# Patient Record
Sex: FEMALE | Race: WHITE | NOT HISPANIC OR LATINO | Employment: FULL TIME | URBAN - METROPOLITAN AREA
[De-identification: names, ages, dates, MRNs, and addresses within clinical notes are randomized per-mention and may not be internally consistent; named-entity substitution may affect disease eponyms.]

---

## 2019-08-26 ENCOUNTER — HOSPITAL ENCOUNTER (OUTPATIENT)
Dept: URGENT CARE | Facility: CLINIC | Age: 24
Discharge: HOME | End: 2019-08-26
Attending: PHYSICIAN ASSISTANT

## 2019-08-29 LAB — BACTERIA SPEC AEROBE CULT: NORMAL

## 2020-02-18 ENCOUNTER — OFFICE VISIT CONVERTED (OUTPATIENT)
Dept: FAMILY MEDICINE CLINIC | Facility: CLINIC | Age: 25
End: 2020-02-18
Attending: NURSE PRACTITIONER

## 2020-02-18 ENCOUNTER — HOSPITAL ENCOUNTER (OUTPATIENT)
Dept: LAB | Facility: HOSPITAL | Age: 25
Discharge: HOME | End: 2020-02-18
Attending: NURSE PRACTITIONER

## 2020-02-18 LAB
ALBUMIN SERPL-MCNC: 4.1 G/DL (ref 3.5–5)
ALBUMIN/GLOB SERPL: 1.1 {RATIO} (ref 1.4–2.6)
ALP SERPL-CCNC: 93 U/L (ref 42–98)
ALT SERPL-CCNC: 32 U/L (ref 10–40)
ANION GAP SERPL CALC-SCNC: 16 MMOL/L (ref 8–19)
AST SERPL-CCNC: 26 U/L (ref 15–50)
BASOPHILS # BLD AUTO: 0.07 10*3/UL (ref 0–0.2)
BASOPHILS NFR BLD AUTO: 0.7 % (ref 0–3)
BILIRUB SERPL-MCNC: 0.37 MG/DL (ref 0.2–1.3)
BUN SERPL-MCNC: 10 MG/DL (ref 5–25)
BUN/CREAT SERPL: 14 {RATIO} (ref 6–20)
CALCIUM SERPL-MCNC: 9.3 MG/DL (ref 8.7–10.4)
CHLORIDE SERPL-SCNC: 101 MMOL/L (ref 99–111)
CHOLEST SERPL-MCNC: 241 MG/DL (ref 107–200)
CHOLEST/HDLC SERPL: 6.9 {RATIO} (ref 3–6)
CONV ABS IMM GRAN: 0.04 10*3/UL (ref 0–0.2)
CONV CO2: 21 MMOL/L (ref 22–32)
CONV IMMATURE GRAN: 0.4 % (ref 0–1.8)
CONV TOTAL PROTEIN: 7.7 G/DL (ref 6.3–8.2)
CREAT UR-MCNC: 0.72 MG/DL (ref 0.5–0.9)
DEPRECATED RDW RBC AUTO: 42.9 FL (ref 36.4–46.3)
EOSINOPHIL # BLD AUTO: 0.11 10*3/UL (ref 0–0.7)
EOSINOPHIL # BLD AUTO: 1.1 % (ref 0–7)
ERYTHROCYTE [DISTWIDTH] IN BLOOD BY AUTOMATED COUNT: 13.1 % (ref 11.7–14.4)
GFR SERPLBLD BASED ON 1.73 SQ M-ARVRAT: >60 ML/MIN/{1.73_M2}
GLOBULIN UR ELPH-MCNC: 3.6 G/DL (ref 2–3.5)
GLUCOSE SERPL-MCNC: 84 MG/DL (ref 65–99)
HCT VFR BLD AUTO: 42.4 % (ref 37–47)
HDLC SERPL-MCNC: 35 MG/DL (ref 40–60)
HGB BLD-MCNC: 13.5 G/DL (ref 12–16)
LDLC SERPL CALC-MCNC: 186 MG/DL (ref 70–100)
LYMPHOCYTES # BLD AUTO: 2.56 10*3/UL (ref 1–5)
LYMPHOCYTES NFR BLD AUTO: 25.7 % (ref 20–45)
MCH RBC QN AUTO: 28.5 PG (ref 27–31)
MCHC RBC AUTO-ENTMCNC: 31.8 G/DL (ref 33–37)
MCV RBC AUTO: 89.6 FL (ref 81–99)
MONOCYTES # BLD AUTO: 0.68 10*3/UL (ref 0.2–1.2)
MONOCYTES NFR BLD AUTO: 6.8 % (ref 3–10)
NEUTROPHILS # BLD AUTO: 6.51 10*3/UL (ref 2–8)
NEUTROPHILS NFR BLD AUTO: 65.3 % (ref 30–85)
NRBC CBCN: 0 % (ref 0–0.7)
OSMOLALITY SERPL CALC.SUM OF ELEC: 276 MOSM/KG (ref 273–304)
PLATELET # BLD AUTO: 314 10*3/UL (ref 130–400)
PMV BLD AUTO: 12.1 FL (ref 9.4–12.3)
POTASSIUM SERPL-SCNC: 4.3 MMOL/L (ref 3.5–5.3)
RBC # BLD AUTO: 4.73 10*6/UL (ref 4.2–5.4)
SODIUM SERPL-SCNC: 134 MMOL/L (ref 135–147)
TRIGL SERPL-MCNC: 102 MG/DL (ref 40–150)
TSH SERPL-ACNC: 2 M[IU]/L (ref 0.27–4.2)
VLDLC SERPL-MCNC: 20 MG/DL (ref 5–37)
WBC # BLD AUTO: 9.97 10*3/UL (ref 4.8–10.8)

## 2020-03-06 ENCOUNTER — OFFICE VISIT CONVERTED (OUTPATIENT)
Dept: FAMILY MEDICINE CLINIC | Facility: CLINIC | Age: 25
End: 2020-03-06
Attending: NURSE PRACTITIONER

## 2020-03-06 ENCOUNTER — HOSPITAL ENCOUNTER (OUTPATIENT)
Dept: FAMILY MEDICINE CLINIC | Facility: CLINIC | Age: 25
Discharge: HOME | End: 2020-03-06
Attending: NURSE PRACTITIONER

## 2020-03-06 ENCOUNTER — HOSPITAL ENCOUNTER (OUTPATIENT)
Dept: GENERAL RADIOLOGY | Facility: HOSPITAL | Age: 25
Discharge: HOME | End: 2020-03-06
Attending: NURSE PRACTITIONER

## 2020-03-12 LAB
CONV LAST MENSTURAL PERIOD: NORMAL
PATHOLOGIST REVIEW: NORMAL
SPECIMEN SOURCE: NORMAL
SPECIMEN SOURCE: NORMAL
THIN PREP CVX: NORMAL

## 2020-03-13 LAB — HPV HYBRID CAPTURE HIGH RISK: NEGATIVE

## 2020-09-18 ENCOUNTER — OFFICE VISIT CONVERTED (OUTPATIENT)
Dept: FAMILY MEDICINE CLINIC | Facility: CLINIC | Age: 25
End: 2020-09-18
Attending: NURSE PRACTITIONER

## 2020-09-18 ENCOUNTER — CONVERSION ENCOUNTER (OUTPATIENT)
Dept: FAMILY MEDICINE CLINIC | Facility: CLINIC | Age: 25
End: 2020-09-18

## 2020-11-19 ENCOUNTER — HOSPITAL ENCOUNTER (OUTPATIENT)
Dept: FAMILY MEDICINE CLINIC | Facility: CLINIC | Age: 25
Discharge: HOME | End: 2020-11-19
Attending: NURSE PRACTITIONER

## 2020-11-19 ENCOUNTER — CONVERSION ENCOUNTER (OUTPATIENT)
Dept: FAMILY MEDICINE CLINIC | Facility: CLINIC | Age: 25
End: 2020-11-19

## 2020-11-19 ENCOUNTER — OFFICE VISIT CONVERTED (OUTPATIENT)
Dept: FAMILY MEDICINE CLINIC | Facility: CLINIC | Age: 25
End: 2020-11-19
Attending: NURSE PRACTITIONER

## 2020-11-24 LAB
CONV LAST MENSTURAL PERIOD: NORMAL
SPECIMEN SOURCE: NORMAL
SPECIMEN SOURCE: NORMAL
THIN PREP CVX: NORMAL

## 2021-03-16 ENCOUNTER — OFFICE VISIT CONVERTED (OUTPATIENT)
Dept: FAMILY MEDICINE CLINIC | Facility: CLINIC | Age: 26
End: 2021-03-16
Attending: STUDENT IN AN ORGANIZED HEALTH CARE EDUCATION/TRAINING PROGRAM

## 2021-04-19 ENCOUNTER — OFFICE VISIT CONVERTED (OUTPATIENT)
Dept: FAMILY MEDICINE CLINIC | Facility: CLINIC | Age: 26
End: 2021-04-19
Attending: NURSE PRACTITIONER

## 2021-04-19 ENCOUNTER — CONVERSION ENCOUNTER (OUTPATIENT)
Dept: FAMILY MEDICINE CLINIC | Facility: CLINIC | Age: 26
End: 2021-04-19

## 2021-05-13 NOTE — PROGRESS NOTES
Progress Note      Patient Name: Roselyn Muniz   Patient ID: [id number]   Sex: Female   YOB: 1995    Primary Care Provider: Tomasz VENTURA    Visit Date: 2020    Provider: AUDREY Buchanan   Location: [facility]   Location Address: [address] [place] [address], [address] [place] [address]  [id number]   Location Phone: [phone]          Chief Complaint  · Annual Exam  · PAP exam  · (Health Maintainence Information Reviewed Under Results)      History Of Present Illness  Roselyn Muniz is a 25 year old /White female who presents for evaluation and treatment of:   Last PAP Smear: 2020.   No current complaints.      Here for follow-up for an ASCUS that she had 6 months ago.    She also is been having congestion and sinus pressure which she gets every year.  And she usually does lose her sense of smell and taste.  However, she did go get Covid tested yesterday and it was negative.  She can go tomorrow just to be sure because the neighbors across the street are positive.    Last Pap was 3/12/2020 it was abnormal with an ASCUS last menstrual period was 2020 she is  0.       Past Medical History  Disease Name Date Onset Notes   Allergies --  --    High blood pressure --  --    Migraine headache --  --          Medication List  Name Date Started Instructions   Fish Oil 1,000 mg (120 mg-180 mg) oral capsule  take 2 capsules by oral route daily   Imitrex 100 mg oral tablet 2020 take 1 tablet by oral route 1X repeatin 2 hours if needed no more than 2 in 24   omeprazole 40 mg oral capsule,delayed release(DR/EC) 2020 TAKE 1 CAPSULE(40 MG) BY MOUTH EVERY DAY BEFORE A MEAL   Proventil HFA 90 mcg/actuation inhalation HFA aerosol inhaler 2020 inhale 1 - 2 puffs (90 - 180 mcg) by inhalation route every 4-6 hours as needed   Sprintec (28) 0.25-35 mg-mcg oral tablet 2020 take 1 tablet by oral route once daily for 28 days   Zyrtec 10 mg  "oral tablet  take 1 tablet (10 mg) by oral route once daily         Allergy List  Allergen Name Date Reaction Notes   NO KNOWN DRUG ALLERGIES --  --  --          Family Medical History  Disease Name Relative/Age Notes   Family history of cancer Aunt/35  Grandmother (maternal)/75   Grandparents- pancretic, cervical Aunt - Cervical cancer   Family history of diabetes mellitus Father/   --          Social History  Finding Status Start/Stop Quantity Notes   Alcohol Never --/-- --  --    Tobacco Never --/-- --  --          Immunizations  NameDate Admin Mfg Trade Name Lot Number Route Inj VIS Given VIS Publication   InfluenzaRefused 02/18/2020 NE Not Entered  NE NE     Comments:          Review of Systems  · Constitutional  o Denies  o : fever, fatigue, weight loss, weight gain  · Cardiovascular  o Denies  o : lower extremity edema, claudication, chest pressure, palpitations  · Respiratory  o Denies  o : shortness of breath, wheezing, cough, hemoptysis, dyspnea on exertion  · Gastrointestinal  o Denies  o : nausea, vomiting, diarrhea, constipation, abdominal pain      Vitals  Date Time BP Position Site L\R Cuff Size HR RR TEMP (F) WT  HT  BMI kg/m2 BSA m2 O2 Sat FR L/min FiO2 HC       11/19/2020 03:03 /84 Sitting    80 - R 16 97.5 181lbs 0oz 5'  4.75\" 30.35 1.94 98 %  21%          Physical Examination  · Constitutional  o Appearance  o : well-nourished, in no acute distress  · Neck  o Inspection/Palpation  o : normal appearance, no masses or tenderness, trachea midline  o Thyroid  o : gland size normal, nontender, no nodules or masses present on palpation  · Respiratory  o Respiratory Effort  o : breathing unlabored  o Inspection of Chest  o : normal appearance  o Auscultation of Lungs  o : normal breath sounds throughout  · Cardiovascular  o Heart  o :   § Auscultation of Heart  § : regular rate and rhythm, no murmurs, gallops or rubs  · Breasts  o Inspection of Breasts  o : breasts symmetrical, no skin changes, no " deformities present, no discharge present  o Palpation of Breasts, Axillae  o : no masses present on palpation, no breast tenderness  · Gastrointestinal  o Abdominal Examination  o : abdomen nontender to palpation, tone normal without rigidity or guarding, no masses present, normal bowel sounds  · Genitourinary  o External Genitalia  o : no inflammation, no lesions present  o Vagina  o : normal vaginal vault, no discharge present, no inflammatory lesions present, no masses present  o Cervix  o : appearance healthy, no lesions present, nontender to palpation, no discharges, no bleeding present, normal midline position  o Anus  o : no inflammation or lesions present  o Perineum  o : perineum within normal limits  · Lymphatic  o Neck  o : no lymphadenopathy present  o Axilla  o : no lymphadenopathy present  o Groin  o : no lymphadenopathy present  · Neurologic  o Mental Status Examination  o :   § Orientation  § : grossly oriented to person, place and time  o Gait and Station  o : normal gait, able to stand without difficulty  · Psychiatric  o Judgement and Insight  o : judgment and insight intact  o Mood and Affect  o : mood normal, affect appropriate          Assessment  · Sinusitis, acute     461.9/J01.90  · Routine gynecological examination     V72.31/Z01.419  · Pap Smear     V76.2/Z01.419      Plan  · Orders  o Pap smear (34392) - V76.2/Z01.419 - 11/19/2020  o ACO-39: Current medications updated and reviewed (, 1159F) - - 11/19/2020  o ACO-14: Influenza immunization was not administered for reasons documented [facility] () - - 11/19/2020  · Medications  o Zithromax Z-Anson 250 mg oral tablet   SIG: take 2 tablets (500 mg) by oral route once daily for 1 day then 1 tablet (250 mg) by oral route once daily for 4 days   DISP: (6) Tablet with 0 refills  Prescribed on 11/19/2020     o Fish Oil 1,000 mg (120 mg-180 mg) oral capsule   SIG: take 2 capsules by oral route daily   DISP: (0) capsule with 0  refills  Discontinued on 11/19/2020     o Medications have been Reconciled  o Transition of Care or Provider Policy  · Instructions  o **Pap Test/Liquid Based:   o Thin Prep  o Source:   o Cervix  o ********  o **Perform Reflex Human Papilloma Virus (HPV) High Risk on this Pap (If atypical squamous cells of the undetermined signifigcance (ASCUS)/Atypical Glandular Cells of undetermined significance (AGCUS): Low Grade Squamous Intraepitheal lesion (LGSIL): **  o No  o ********  o Medicare:  o **Is this an annual PAP:  o Yes  o **Clinical History  o Last Menstrual Period (First Day of): 11/09/20  o Previous Pap date: 03/12/20  o Lab in which Performed: HPV negative  o Abnormal  o Patient was educated/instructed on their diagnosis, treatment and medications prior to discharge from the clinic today.  o Risks, benefits, and alternatives were discussed with the patient. The patient is aware of risks associated with:  o Chronic conditions reviewed and taken into consideration for today's treatment plan.  o Counseled on monthly breast self exams.   o Counseled on STD prevention.  o Counseled on diet and exercise.   o Counseled on weight-bearing exercise.  o Recommended Calcium with Vitamin D twice daily.  · Disposition  o Call or Return if symptoms worsen or persist.            Electronically Signed by: AUDREY Buchanan -Author on November 19, 2020 03:37:23 PM

## 2021-05-13 NOTE — PROGRESS NOTES
Progress Note      Patient Name: Roselyn Muniz   Patient ID: [id number]   Sex: Female   YOB: 1995    Primary Care Provider: Tomasz VENTURA    Visit Date: September 18, 2020    Provider: AUDREY Buchanan   Location: [facility]   Location Address: [address] [place] [address], [address] [place] [address]  [id number]   Location Phone: [phone]          Chief Complaint     The patient is here for headaches 2-3 times a month.       History Of Present Illness  Roselyn Muniz is a 25 year old /White female who presents for evaluation and treatment of:      Headaches: mainly on the left side of head. this happens usually once/week. She takes ibuprofen, Tylenol, Excedrin. Most of the times it does help but it takes awhile to start working. She also tries CBD oil at night and it helps some. These never take the headache away. She feels more relief when she lays down and her eyes are shut. she had a mri in March. This did show empty sella.  Patient states that her headaches are the same as they were before she started birth control pills and they have not changed in intensity or and location and there is just as intense on the days that she is on her.  And she is on her active pills.    she did have a normal eye exam earlier this year    Allergies: She went to [facility] and had allergy testing recently. She started taking Zyrtec OTC and doing well.       Past Medical History  Disease Name Date Onset Notes   Allergies --  --    High blood pressure --  --    Migraine headache --  --          Medication List  Name Date Started Instructions   Fish Oil 1,000 mg (120 mg-180 mg) oral capsule  take 2 capsules by oral route daily   omeprazole 40 mg oral capsule,delayed release(DR/EC) 03/17/2020 TAKE 1 CAPSULE(40 MG) BY MOUTH EVERY DAY BEFORE A MEAL   Proventil HFA 90 mcg/actuation inhalation HFA aerosol inhaler 03/06/2020 inhale 1 - 2 puffs (90 - 180 mcg) by inhalation route  "every 4-6 hours as needed   Sprintec (28) 0.25-35 mg-mcg oral tablet 08/14/2020 take 1 tablet by oral route once daily for 28 days   Zyrtec 10 mg oral tablet  take 1 tablet (10 mg) by oral route once daily         Allergy List  Allergen Name Date Reaction Notes   NO KNOWN DRUG ALLERGIES --  --  --          Family Medical History  Disease Name Relative/Age Notes   Family history of cancer Aunt/35  Grandmother (maternal)/75   Grandparents- pancretic, cervical Aunt - Cervical cancer   Family history of diabetes mellitus Father/   --          Social History  Finding Status Start/Stop Quantity Notes   Alcohol Never --/-- --  --    Tobacco Never --/-- --  --          Immunizations  NameDate Admin Mfg Trade Name Lot Number Route Inj VIS Given VIS Publication   InfluenzaRefused 02/18/2020 NE Not Entered  NE NE     Comments:          Review of Systems  · Constitutional  o Denies  o : fever, fatigue, weight loss, weight gain  · HENT  o Admits  o : headaches  · Cardiovascular  o Denies  o : lower extremity edema, claudication, chest pressure, palpitations  · Respiratory  o Denies  o : shortness of breath, wheezing, cough, hemoptysis, dyspnea on exertion  · Gastrointestinal  o Denies  o : nausea, vomiting, diarrhea, constipation, abdominal pain      Vitals  Date Time BP Position Site L\R Cuff Size HR RR TEMP (F) WT  HT  BMI kg/m2 BSA m2 O2 Sat        09/18/2020 03:15 /70 Sitting    89 - R 16 98 190lbs 0oz 5'  4.75\" 31.86 1.98 98 %          Physical Examination  · Constitutional  o Appearance  o : well-nourished, well developed, alert, in no acute distress  · Eyes  o Conjunctivae  o : conjunctivae normal  o Sclerae  o : sclerae white  o Corneas  o : tear film normal, no lesions present  o Eyelids/Ocular Adnexae  o : eyelid appearance normal, no exudates present, eye moisture level normal  · Neck  o Inspection/Palpation  o : normal appearance, no masses or tenderness, trachea midline  · Respiratory  o Respiratory " Effort  o : breathing unlabored  o Inspection of Chest  o : normal appearance, no retractions  o Auscultation of Lungs  o : normal breath sounds throughout  · Cardiovascular  o Heart  o :   § Auscultation of Heart  § : regular rate and rhythm without murmur, PMI normal  o Peripheral Vascular System  o :   § Extremities  § : no cyanosis, clubbing or edema; less than 2 second refill noted  · Musculoskeletal  o General  o : No joint swelling or deformity noted. Muscle tone, strength and development grossly normal.  · Skin and Subcutaneous Tissue  o General Inspection  o : no rashes or lesions present, no areas of discoloration  · Neurologic  o Mental Status Examination  o : judgement, insight intact, modd and affect appropriate  o Motor Examination  o : strength grossly intact in all four extremities  o Gait and Station  o : normal gait, able to stand without difficulty          Assessment  · Need for influenza vaccination     V04.81/Z23  · Allergic rhinitis due to allergen     477.9/J30.9  · Headache     784.0/R51      Plan  · Orders  o ACO-39: Current medications updated and reviewed () - - 09/18/2020  o ACO-14: Influenza immunization administered or previously received () - - 09/18/2020  · Medications  o Imitrex 100 mg oral tablet   SIG: take 1 tablet by oral route 1X repeatin 2 hours if needed no more than 2 in 24   DISP: (9) tablets with 5 refills  Prescribed on 09/18/2020     o Medications have been Reconciled  o Transition of Care or Provider Policy  · Instructions  o Patient was educated/instructed on their diagnosis, treatment and medications prior to discharge from the clinic today.  o Minutes spent with patient including greater than 50% in Education/Counseling/Care Coordination.  o Time spent with the patient was minutes, more than 50% face to face.  · Disposition  o Return in 2 months for f/u            Electronically Signed by: AUDREY Buchanan -Author on September 18, 2020 05:16:09 PM

## 2021-05-14 VITALS
HEART RATE: 81 BPM | BODY MASS INDEX: 30.39 KG/M2 | DIASTOLIC BLOOD PRESSURE: 76 MMHG | RESPIRATION RATE: 16 BRPM | SYSTOLIC BLOOD PRESSURE: 124 MMHG | HEIGHT: 64 IN | WEIGHT: 178 LBS | TEMPERATURE: 97.9 F | OXYGEN SATURATION: 99 %

## 2021-05-14 VITALS
BODY MASS INDEX: 30.78 KG/M2 | HEART RATE: 97 BPM | RESPIRATION RATE: 16 BRPM | HEIGHT: 64 IN | WEIGHT: 180.31 LBS | DIASTOLIC BLOOD PRESSURE: 76 MMHG | SYSTOLIC BLOOD PRESSURE: 128 MMHG | OXYGEN SATURATION: 98 % | TEMPERATURE: 98.1 F

## 2021-05-14 VITALS
WEIGHT: 190 LBS | SYSTOLIC BLOOD PRESSURE: 110 MMHG | OXYGEN SATURATION: 98 % | HEART RATE: 89 BPM | HEIGHT: 64 IN | DIASTOLIC BLOOD PRESSURE: 70 MMHG | TEMPERATURE: 98 F | BODY MASS INDEX: 32.44 KG/M2 | RESPIRATION RATE: 16 BRPM

## 2021-05-14 VITALS
RESPIRATION RATE: 16 BRPM | HEIGHT: 64 IN | SYSTOLIC BLOOD PRESSURE: 120 MMHG | HEART RATE: 80 BPM | DIASTOLIC BLOOD PRESSURE: 84 MMHG | WEIGHT: 181 LBS | BODY MASS INDEX: 30.9 KG/M2 | OXYGEN SATURATION: 98 % | TEMPERATURE: 97.5 F

## 2021-05-14 NOTE — PROGRESS NOTES
Progress Note      Patient Name: Roselyn Muniz   Patient ID: [id number]   Sex: Female   YOB: 1995    Primary Care Provider: Tomasz VENTURA    Visit Date: March 16, 2021    Provider: Nimisha York MD   Location: [facility]   Location Address: [address] [place] [address], [address] [place] [address]  [id number]   Location Phone: [phone]          Chief Complaint     Spot on chin       History Of Present Illness  Roselyn Muniz is a 25 year old /White female who presents for evaluation and treatment of:      25 years old female comes to the clinic today for an acute visit.    Patient had teeth cleaning done a week ago.  Patient started to have small bump and pain on the anterior chain.  Patient denies any trauma, fever, any swallowing or chewing difficulties.  Reports mild to moderate tenderness at the inferior border of the anterior chin.  No significant erythema seen or any difficulty/pain in the mouth.       Past Medical History  Disease Name Date Onset Notes   Allergies --  --    High blood pressure --  --    Migraine headache --  --          Medication List  Name Date Started Instructions   albuterol sulfate 90 mcg/actuation inhalation HFA aerosol inhaler 11/19/2020 inhale 1 - 2 puffs (90 - 180 mcg) by inhalation route every 4-6 hours as needed   NURTEC 75MG ODT TABLETS 01/04/2021 DISSOLVE 1 TABLET ON THE TONGUE THEN SWALLOW AS NEEDED FOR MIGRAINE(MAX 1 DOSE/24 HOURS)   Nurtec ODT 75 mg oral tablet,disintegrating 11/20/2020 take 1 tablet (75 mg) and place on tongue, dissolve then swallow once as needed for migraine; max 1 dose/24 hrs for 30 days   omeprazole 40 mg oral capsule,delayed release(DR/EC) 03/17/2020 TAKE 1 CAPSULE(40 MG) BY MOUTH EVERY DAY BEFORE A MEAL   Sprintec (28) 0.25-35 mg-mcg oral tablet 08/14/2020 take 1 tablet by oral route once daily for 28 days   Zithromax Z-Anson 250 mg oral tablet 11/19/2020 take 2 tablets (500 mg) by oral route once daily for 1 day  "then 1 tablet (250 mg) by oral route once daily for 4 days   Zyrtec 10 mg oral tablet  take 1 tablet (10 mg) by oral route once daily         Allergy List  Allergen Name Date Reaction Notes   NO KNOWN DRUG ALLERGIES --  --  --        Allergies Reconciled  Family Medical History  Disease Name Relative/Age Notes   Family history of cancer Aunt/35  Grandmother (maternal)/75   Grandparents- pancretic, cervical Aunt - Cervical cancer   Family history of diabetes mellitus Father/   --          Social History  Finding Status Start/Stop Quantity Notes   Alcohol Never --/-- --  --    Tobacco Never --/-- --  --          Immunizations  NameDate Admin Mfg Trade Name Lot Number Route Inj VIS Given VIS Publication   InfluenzaRefused 02/18/2020 NE Not Entered  NE NE     Comments:          Review of Systems  · Constitutional  o Denies  o : fatigue, fever  · Eyes  o Denies  o : discharge from eye, redness  · HENT  o Denies  o : headaches, congestion  · Cardiovascular  o Denies  o : chest pain, palpitations  · Respiratory  o Denies  o : shortness of breath, wheezing, cough  · Gastrointestinal  o Denies  o : vomiting, diarrhea, constipation  · Genitourinary  o Denies  o : dysuria, hematuria  · Integument  o Admits  o : rash  o Denies  o : new skin lesions      Vitals  Date Time BP Position Site L\R Cuff Size HR RR TEMP (F) WT  HT  BMI kg/m2 BSA m2 O2 Sat FR L/min FiO2 HC       03/16/2021 08:58 /76 Sitting    97 - R 16 98.1 180lbs 5oz 5'  4.75\" 30.24 1.93 98 %  21%          Physical Examination  · Constitutional  o Appearance  o : alert, in no acute distress, well developed, well-nourished  · Head and Face  o Head  o : normocephalic, atraumatic, non tender, no palpable masses or nodules.  o Face  o : Almost 4 x 4 centimeter bump at the anterior surface of the chin, very mild erythema and mild tenderness at the inferior border of the chin. No palpated bumps or any abnormalities noted in the mouth  · Eyes  o Vision  o : Acuity: " grossly normal at distance, Conjuntivae: Normal, Sclerae white, Pupils: PERRL, Cornea: Clear, no lesions bilateral  · Ears, Nose, Mouth and Throat  o Ears  o : Ext. Ears: Normal shape, Non tender, EACs: Normal , TMs: Normal, Hearing: intact to conversational voice bilaterally  o Nose  o : Nose: Normal shape, No external deformity, No nasal discharge, Mucosa: normal, Septum: midline, Sinuses: Nontender   o Oral Cavity  o : Normal oral mucosa, Normal lips, Gums: normal pink, non swollen, Tongue: Normal appearance, Palate : Normal   o Throat  o : Oropharynx: no inflmation or lesions, Tonsils: within normal limits  · Neck  o Inspection/Palpation  o : Supple, no masses or tenderness, no deformities, Trachea: Midline, ROM: with in normal limits, no neck stiffness  o Thyroid  o : no thyomegaly, no palpabale masses   · Respiratory  o Auscultation of Lungs  o : normal breath sounds throughout  · Cardiovascular  o Heart  o : Regular rate and rhythm, Normal S1,S2 , No cardiac murmers, No S3 or S4 gallop or rubs  · Skin and Subcutaneous Tissue  o General Inspection  o : Mild swelling noted at the chin  · Neurologic  o Mental Status Examination  o : alert and oriented to time, place, and person., Cranial Nerves: grossly intact,           Assessment  · Abscess     682.9/L02.91  · Cellulitis     682.9/L03.90       --We will treat patient empirically with Bactrim,warm compress discussed  --If any worsening symptoms or no improvement within the next 3 days; instructed to return to the clinic  --ER precautions discussed,  --If no improvement noted; will consider ultrasound and/or I&D  --Patient understands and agrees with the plan       Plan  · Orders  o ACO-14: Influenza immunization was not administered for reasons documented [facility] () - - 03/16/2021  o ACO-39: Current medications updated and reviewed (6941Z, ) - - 03/16/2021  · Medications  o Bactrim -160 mg oral tablet   SIG: take 1 tablet by oral route every 12  hours for 7 days   DISP: (14) Tablet with 0 refills  Prescribed on 03/16/2021     o Imitrex 100 mg oral tablet   SIG: take 1 tablet by oral route 1X repeatin 2 hours if needed no more than 2 in 24   DISP: (9) tablets with 5 refills  Discontinued on 03/16/2021     o Medications have been Reconciled  o Transition of Care or Provider Policy  · Instructions  o Patient was educated/instructed on their diagnosis, treatment and medications prior to discharge from the clinic today.  o Patient instructed to seek medical attention urgently for new or worsening symptoms.  o Call the office with any concerns or questions.  o Discussed Covid-19 precautions including, but not limited to, social distancing, avoid touching your face, and hand washing.   · Disposition  o Call or Return if symptoms worsen or persist.  o Follow Up PRN.  o Follow Up in 1 week.            Electronically Signed by: Nimisha York MD -Author on March 16, 2021 09:21:04 AM

## 2021-05-14 NOTE — PROGRESS NOTES
Progress Note      Patient Name: Roselyn Muniz   Patient ID: [id number]   Sex: Female   YOB: 1995    Primary Care Provider: Tomasz VENTURA    Visit Date: April 19, 2021    Provider: AUDREY Buchanan   [place] [facility] [place]   [place] Address: [address] [place] [address], [address] [place] [address]  [id number]   Location Phone: [phone]          Chief Complaint     The patient is here for a f/u of migraines, needs her yearly labs.       History Of Present Illness  Roselyn Muniz is a 25 year old /White female who presents for evaluation and treatment of:      Migraines not having as many as was.  Nurtec working well ad Imitrex didn't.    Asthma allergies:  Not taking Zyrtec and cough and sneeze in the am.  May need to switch antihistamines.  Only in the am and when eats.  Omeprazole didn't stop the cough.  Worse with milk or dairy but happens with.  Feels like something stuck.       Past Medical History  Disease Name Date Onset Notes   Allergies --  --    High blood pressure --  --    Migraine headache --  --          Medication List  Name Date Started Instructions   albuterol sulfate 90 mcg/actuation inhalation HFA aerosol inhaler 11/19/2020 inhale 1 - 2 puffs (90 - 180 mcg) by inhalation route every 4-6 hours as needed   ESTARYLLA TABLETS 28S 03/21/2021 TAKE 1 TABLET BY MOUTH EVERY DAY   Nurtec ODT 75 mg oral tablet,disintegrating 04/19/2021 take 1 tablet (75 mg) and place on tongue, dissolve then swallow once as needed for migraine; max 1 dose/24 hrs for 30 days   omeprazole 40 mg oral capsule,delayed release(DR/EC) 03/17/2020 TAKE 1 CAPSULE(40 MG) BY MOUTH EVERY DAY BEFORE A MEAL         Allergy List  Allergen Name Date Reaction Notes   NO KNOWN DRUG ALLERGIES --  --  --          Family Medical History  Disease Name Relative/Age Notes   Family history of cancer Aunt/35  Grandmother (maternal)/75   Grandparents- pancretic, cervical Aunt - Cervical cancer   Family  "history of diabetes mellitus Father/   --          Social History  Finding Status Start/Stop Quantity Notes   Alcohol Never --/-- --  --    Tobacco Never --/-- --  --          Immunizations  NameDate Admin Mfg Trade Name Lot Number Route Inj VIS Given VIS Publication   InfluenzaRefused 02/18/2020 NE Not Entered  NE NE     Comments:          Review of Systems  · Constitutional  o Denies  o : fever, fatigue, weight loss, weight gain  · Cardiovascular  o Denies  o : lower extremity edema, claudication, chest pressure, palpitations  · Respiratory  o Denies  o : shortness of breath, wheezing, cough, hemoptysis, dyspnea on exertion  · Gastrointestinal  o Denies  o : nausea, vomiting, diarrhea, constipation, abdominal pain      Vitals  Date Time BP Position Site L\R Cuff Size HR RR TEMP (F) WT  HT  BMI kg/m2 BSA m2 O2 Sat FR L/min FiO2 HC       04/19/2021 10:34 /76 Sitting    81 - R 16 97.9 178lbs 0oz 5'  4.75\" 29.85 1.92 99 %  21%          Physical Examination  · Constitutional  o Appearance  o : well-nourished, well developed, alert, in no acute distress  · Eyes  o Conjunctivae  o : conjunctivae normal  o Sclerae  o : sclerae white  o Pupils and Irises  o : pupils equal, round, and reactive to light and accommodation bilaterally  o Corneas  o : tear film normal, no lesions present  o Eyelids/Ocular Adnexae  o : eyelid appearance normal, no exudates present, eye moisture level normal  · Ears, Nose, Mouth and Throat  o Ears  o : external ear auricle normal, otic canal normal, TM with no reddness, effusion, retraction  o Nose  o : external normal, nasal mucosa normal, turbinates normal  o Oral Cavity  o : tongue no lesion, oral mucosa normal  o Throat  o : no erythemia, exudate or lesions  · Neck  o Inspection/Palpation  o : normal appearance, no masses or tenderness, trachea midline, no enlarged cervical or supraclavicular lymphnodes palpated  o Thyroid  o : gland size normal, nontender, no nodules or masses present " on palpation, thyroid motion normal during swallowing  · Respiratory  o Respiratory Effort  o : breathing unlabored  o Inspection of Chest  o : normal appearance, no retractions  o Auscultation of Lungs  o : normal breath sounds throughout  · Cardiovascular  o Heart  o :   § Auscultation of Heart  § : regular rate and rhythm without murmur, PMI normal  o Peripheral Vascular System  o :   § Carotid Arteries  § : normal pulses bilaterally, no bruits present  § Pedal Pulses  § : pulses 2 bilaterally  § Extremities  § : no cyanosis, clubbing or edema; less than 2 second refill noted  · Musculoskeletal  o General  o : No joint swelling or deformity noted. Muscle tone, strength and development grossly normal.  · Skin and Subcutaneous Tissue  o General Inspection  o : no rashes or lesions present, no areas of discoloration  · Neurologic  o Mental Status Examination  o : judgement, insight intact, modd and affect appropriate  o Motor Examination  o : strength grossly intact in all four extremities  o Gait and Station  o : normal gait, able to stand without difficulty          Assessment  · Screening for depression     V79.0/Z13.89  · Annual physical exam     V70.0/Z00.00  · Allergic rhinitis due to allergen     477.9/J30.9  · Asthma     493.90/J45.909  · Cough     786.2/R05  · Encounter for contraceptive management     V25.9/Z30.9  · Headache     784.0/R51  · Throat fullness     784.99/R68.89      Plan  · Orders  o Annual depression screening, 15 minutes (, [address]) - V79.0/Z13.89 - 04/19/2021  o ACO-18: Negative screen for clinical depression using a standardized tool () - V79.0/Z13.89 - 04/19/2021  o Physical, Primary Care Panel (CBC, CMP, Lipid, TSH) [facility] ([id number], 12161, 48561, 92045) - V70.0/Z00.00 - 04/19/2021  o ACO-14: Influenza immunization administered or previously received [facility] () - - 04/19/2021  o ACO-39: Current medications updated and reviewed (1159F, ) - - 04/19/2021  o ENT CONSULTATION (ENT[facility])  - 786.2/R05, 784.99/R68.89 - 04/19/2021  · Medications  o Medications have been Reconciled  o Transition of Care or Provider Policy  · Instructions  o Depression Screen completed and scanned into the EMR under the designated folder within the patient's documents.  o Today's PHQ-9 result is __2_  o The provider screening met the required time of 15 minutes.  o Reviewed health maintenance flowsheet and updated information. Orders were placed and/or patient's response was documented.  o Patient was educated/instructed on their diagnosis, treatment and medications prior to discharge from the clinic today.  o Minutes spent with patient including greater than 50% in Education/Counseling/Care Coordination.  o Time spent with the patient was minutes, more than 50% face to face.  · Disposition  o Return in 2 months for f/u  o Care Transition  o BRIANNA Sent            Electronically Signed by: AUDREY Buchanan -Author on April 19, 2021 11:33:43 AM

## 2021-05-15 VITALS
BODY MASS INDEX: 33.29 KG/M2 | OXYGEN SATURATION: 95 % | RESPIRATION RATE: 16 BRPM | WEIGHT: 195 LBS | HEART RATE: 80 BPM | DIASTOLIC BLOOD PRESSURE: 76 MMHG | HEIGHT: 64 IN | TEMPERATURE: 98.1 F | SYSTOLIC BLOOD PRESSURE: 98 MMHG

## 2021-05-15 VITALS
RESPIRATION RATE: 16 BRPM | OXYGEN SATURATION: 96 % | DIASTOLIC BLOOD PRESSURE: 62 MMHG | SYSTOLIC BLOOD PRESSURE: 100 MMHG | WEIGHT: 193 LBS | TEMPERATURE: 97.7 F | HEART RATE: 89 BPM | HEIGHT: 64 IN | BODY MASS INDEX: 32.95 KG/M2

## 2021-05-28 ENCOUNTER — CONVERSION ENCOUNTER (OUTPATIENT)
Dept: OTOLARYNGOLOGY | Facility: CLINIC | Age: 26
End: 2021-05-28

## 2021-05-28 ENCOUNTER — OFFICE VISIT CONVERTED (OUTPATIENT)
Dept: OTOLARYNGOLOGY | Facility: CLINIC | Age: 26
End: 2021-05-28
Attending: OTOLARYNGOLOGY

## 2021-06-05 NOTE — H&P
History and Physical      Patient Name: Roselyn Muniz   Patient ID: [id number]   Sex: Female   YOB: 1995    Primary Care Provider: Tomasz VENTURA   Referring Provider: Tomasz VENTURA    Visit Date: May 28, 2021    Provider: Marshall Akins MD   [place] [facility]   Location Address: [address] [place] [address], [address]  [id number]   Location Phone: [phone]          Chief Complaint     1.  Chronic cough with throat clearing    2.  GERD    3.  Globus pharyngeus       History Of Present Illness  Roselyn Muniz is a 25 year old /White female who presents to the office today as a consult from Tomasz VENTURA.      She presents the clinic today for evaluation of a several month history of fullness sensation in the lower part of her throat.  She denies any voice changes with this or any progressive symptoms, but does have some discomfort.  She informs me that she has had issues with throat clearing and chronic cough for many years.  She does have some difficulty with GERD, was previously on omeprazole for this, but is currently not using anything.  She does sometimes eat late at night, and does tend to snack throughout the day.  She denies any weight loss or malaise.  She denies any pain with swallowing, but does have some discomfort at times.  Never been a heavy smoker or drinker.       Past Medical History  Allergies; High blood pressure; Migraine headache; Throat clearing; Throat fullness         Past Surgical History  *Denies any surgical procedures         Medication List  albuterol sulfate 90 mcg/actuation inhalation HFA aerosol inhaler; Estarylla 0.25-35 mg-mcg oral tablet; Maxalt 10 mg oral tablet; Nurtec ODT 75 mg oral tablet,disintegrating; OMEPRAZOLE 40MG CAPSULES         Allergy List  NO KNOWN DRUG ALLERGIES         Family Medical History  Family history of cancer; Family history of diabetes mellitus         Social History  Alcohol (Never);  "Tobacco (Never)         Immunizations  Name Date Admin   Influenza Refused         Review of Systems  · Constitutional  o Denies  o : fever, night sweats, weight loss  · Eyes  o Denies  o : discharge from eye, impaired vision  · HENT  o Admits  o : *See HPI  · Cardiovascular  o Denies  o : chest pain, irregular heart beats  · Respiratory  o Denies  o : shortness of breath, wheezing, coughing up blood  · Gastrointestinal  o Denies  o : heartburn, reflux, vomiting blood  · Genitourinary  o Denies  o : frequency  · Integument  o Denies  o : rash, skin dryness  · Neurologic  o Denies  o : seizures, loss of balance, loss of consciousness, dizziness  · Endocrine  o Denies  o : cold intolerance, heat intolerance  · Heme-Lymph  o Denies  o : easy bleeding, anemia      Vitals  Date Time BP Position Site L\R Cuff Size HR RR TEMP (F) WT  HT  BMI kg/m2 BSA m2 O2 Sat FR L/min FiO2        05/28/2021 10:48 AM        97.2 184lbs 6oz 5'  3\" 32.66 1.93             Physical Examination  · Constitutional  o Appearance  o : well developed, well-nourished, alert and in no acute distress, voice clear and strong  · Head and Face  o Head  o :   § Inspection  § : no deformities or lesions  o Face  o :   § Inspection  § : No facial lesions; House-Brackmann I/VI bilaterally  § Palpation  § : No TMJ crepitus nor  muscle tenderness bilaterally  · Eyes  o Vision  o :   § Visual Fields  § : Extraocular movements are intact. No spontaneous or gaze-induced nystagmus.  o Conjunctivae  o : clear  o Sclerae  o : clear  o Pupils and Irises  o : pupils equal, round, and reactive to light.   · Ears, Nose, Mouth and Throat  o Ears  o :   § External Ears  § : appearance within normal limits, no lesions present  § Otoscopic Examination  § : tympanic membrane appearance within normal limits bilaterally without perforations, well-aerated middle ears  § Hearing  § : intact to conversational voice both ears  o Nose  o :   § External Nose  § : " appearance normal  § Intranasal Exam  § : mucosa within normal limits, vestibules normal, no intranasal lesions present, septum midline, sinuses non tender to percussion  o Oral Cavity  o :   § Oral Mucosa  § : oral mucosa normal without pallor or cyanosis  § Lips  § : lip appearance normal  § Teeth  § : normal dentition for age  § Gums  § : gums pink, non-swollen, no bleeding present  § Tongue  § : tongue appearance normal; normal mobility  § Palate  § : hard palate normal, soft palate appearance normal with symmetric mobility  o Throat  o :   § Oropharynx  § : no inflammation or lesions present, tonsils within normal limits  § Hypopharynx  § : appearance within normal limits, superior epiglottis within normal limits  § Larynx  § : appearance within normal limits, vocal cords within normal limits, no lesions present  o Nasopharyngoscopy  o : The patients nares were anesthetized with Lidocaine with Afrin. After this was done, the flexible nasopharyngoscope was passed through both the left and right sides. The nasal cavities free of any lesions, polyps, or purulence. The vocal folds are bilaterally mobile with good excursion. Significant interarytenoid pachydermia, consistent with GERD.  · Neck  o Inspection/Palpation  o : normal appearance, no masses or tenderness, trachea midline; thyroid size normal, nontender, no nodules or masses present on palpation  · Respiratory  o Respiratory Effort  o : breathing unlabored  o Inspection of Chest  o : normal appearance, no retractions  · Cardiovascular  o Heart  o : regular rate and rhythm  · Lymphatic  o Neck  o : no lymphadenopathy present  o Supraclavicular Nodes  o : no lymphadenopathy present  o Preauricular Nodes  o : no lymphadenopathy present  · Skin and Subcutaneous Tissue  o General Inspection  o : Regarding face and neck - there are no rashes present, no lesions present, and no areas of discoloration  · Neurologic  o Cranial Nerves  o : cranial nerves II-XII are  grossly intact bilaterally  o Gait and Station  o : normal gait, able to stand without diffculty  · Psychiatric  o Judgement and Insight  o : judgment and insight intact  o Mood and Affect  o : mood normal, affect appropriate          Assessment  · Dysphagia     787.20/R13.10  · GERD (gastroesophageal reflux disease)     530.81/K21.9  · Globus pharyngeus     306.4/R09.89      Plan  · Orders  o Laryngoscopy (Flex) [facility] ([id number]) - 306.4/R09.89, 787.20/R13.10, 530.81/K21.9 - 06/01/2021  · Medications  o Medications have been Reconciled  o Transition of Care or Provider Policy  · Instructions  o She presents the clinic today for evaluation of a several month history of fullness sensation in the lower part of her throat. She denies any voice changes with this or any progressive symptoms, but does have some discomfort. She informs me that she has had issues with throat clearing and chronic cough for many years. She does have some difficulty with GERD, was previously on omeprazole for this, but is currently not using anything. She does sometimes eat late at night, and does tend to snack throughout the day. She denies any weight loss or malaise. She denies any pain with swallowing, but does have some discomfort at times. Never been a heavy smoker or drinker. Patient today revealed a normal sounding voice. Nasolaryngoscopy was performed and did show significant interarytenoid pachydermia, likely from GERD. I discussed the findings with her, and recommended avoiding foods that exacerbate GERD, avoiding late night meals, avoiding snacking during the day, head of bed elevation, using omeprazole at dinnertime. I also recommended taking sips of water anytime she has the urge to clear her throat or cough to break herself of any habitual cough that may have formed. Should there be any worsening despite this, I asked her to contact me for further evaluation.  o Electronically Identified Patient Education Materials Provided  Electronically  · Correspondence  o ENT Letter to Referring MD (Tomasz VENTURA) - 06/01/2021            Electronically Signed by: Marshall Akins MD -Author on June 1, 2021 03:16:13 PM

## 2021-07-07 ENCOUNTER — TELEPHONE (OUTPATIENT)
Dept: FAMILY MEDICINE CLINIC | Facility: CLINIC | Age: 26
End: 2021-07-07

## 2021-07-07 NOTE — TELEPHONE ENCOUNTER
Caller: Raz Muniz    Relationship: Self    Best call back number:[phone]    What is the best time to reach you: ANYTIME    Who are you requesting to speak with (clinical staff, provider,  specific staff member): MICHAEL  Do you know the name of the person who called: RAZ    What was the call regarding: REAUTHORIZE OF MEDICATION    Do you require a callback: YES    PATIENT STATED THAT THIS REQUEST WAS ORIGINALLY SENT  BACK IN June AND THE PATIENT DID NOT FOLLOW UP LIKE SHE SHOULD BUT IS NEEDING IT DONE AGAIN.

## 2021-07-08 RX ORDER — RIMEGEPANT SULFATE 75 MG/75MG
75 TABLET, ORALLY DISINTEGRATING ORAL DAILY PRN
Qty: 8 TABLET | Refills: 5 | Status: SHIPPED | OUTPATIENT
Start: 2021-07-08 | End: 2021-10-14 | Stop reason: SDUPTHER

## 2021-07-08 NOTE — TELEPHONE ENCOUNTER
Pended order for Nurtec to go to [facility] pharmacy, left message letting pt know they will be contacting her as well as gave pt phone number.

## 2021-07-15 VITALS — BODY MASS INDEX: 32.67 KG/M2 | HEIGHT: 63 IN | WEIGHT: 184.37 LBS | TEMPERATURE: 97.2 F

## 2021-10-13 ENCOUNTER — TELEPHONE (OUTPATIENT)
Dept: FAMILY MEDICINE CLINIC | Facility: CLINIC | Age: 26
End: 2021-10-13

## 2021-10-13 NOTE — TELEPHONE ENCOUNTER
Caller: Roselyn Muniz    Relationship: Self    Best call back number: [phone]     Who are you requesting to speak with (clinical staff, provider,  specific staff member): MSNicole MACI WEBSTER    What was the call regarding: THE PATIENT HAS HAD A BAD HEADACHE SINCE 10/12, AND IS REQUESTING TO KNOW IF THERE WERE ANY SAMPLES AVAILABLE FOR Rimegepant Sulfate (Nurtec) 75 MG tablet dispersible tablet    PATIENT STATED THIS WAS THE ONLY PRESCRIPTION THAT HAS HELPED WITH HER HEADACHES.     Do you require a callback: YES, PLEASE CALL AND ADVISE.

## 2021-10-14 RX ORDER — RIMEGEPANT SULFATE 75 MG/75MG
75 TABLET, ORALLY DISINTEGRATING ORAL DAILY PRN
Qty: 4 TABLET | Refills: 0 | COMMUNITY
Start: 2021-10-14 | End: 2021-10-20

## 2021-10-18 ENCOUNTER — TELEPHONE (OUTPATIENT)
Dept: FAMILY MEDICINE CLINIC | Facility: CLINIC | Age: 26
End: 2021-10-18

## 2021-10-18 NOTE — TELEPHONE ENCOUNTER
Pharmacy Name:  [facility] (New Address) - [address] AT Previously: [address], [place] - [phone] [facility] [phone]   [phone]    Pharmacy representative name: SAVANAH    Pharmacy representative phone number: [phone]    What medication are you calling in regards to: Rimegepant Sulfate (Nurtec) 75 MG tablet dispersible tablet    What question does the pharmacy have: PHARMACY CALLED TO LET US KNOW THAT THE MEDICATION GOT DENIED, SO SHE IS WANTING TO SEE IF WE ARE GOING TO FILE AN APPEAL WITH PATIENT'S INSURANCE COMPANY.     Additional notes: YES

## 2021-10-20 ENCOUNTER — OFFICE VISIT (OUTPATIENT)
Dept: FAMILY MEDICINE CLINIC | Facility: CLINIC | Age: 26
End: 2021-10-20

## 2021-10-20 VITALS
SYSTOLIC BLOOD PRESSURE: 120 MMHG | WEIGHT: 189.4 LBS | HEART RATE: 79 BPM | BODY MASS INDEX: 33.56 KG/M2 | DIASTOLIC BLOOD PRESSURE: 80 MMHG | OXYGEN SATURATION: 99 % | HEIGHT: 63 IN | RESPIRATION RATE: 16 BRPM | TEMPERATURE: 98.1 F

## 2021-10-20 DIAGNOSIS — G43.809 OTHER MIGRAINE WITHOUT STATUS MIGRAINOSUS, NOT INTRACTABLE: Primary | ICD-10-CM

## 2021-10-20 DIAGNOSIS — I10 PRIMARY HYPERTENSION: ICD-10-CM

## 2021-10-20 PROBLEM — G43.909 MIGRAINE HEADACHE: Status: ACTIVE | Noted: 2021-10-20

## 2021-10-20 PROBLEM — J01.80 OTHER ACUTE SINUSITIS: Status: ACTIVE | Noted: 2021-10-20

## 2021-10-20 PROBLEM — R09.89 THROAT FULLNESS: Status: ACTIVE | Noted: 2021-10-20

## 2021-10-20 PROBLEM — R09.89 THROAT CLEARING: Status: ACTIVE | Noted: 2021-10-20

## 2021-10-20 PROCEDURE — 99214 OFFICE O/P EST MOD 30 MIN: CPT | Performed by: NURSE PRACTITIONER

## 2021-10-20 RX ORDER — CETIRIZINE HYDROCHLORIDE 10 MG/1
TABLET ORAL
COMMUNITY
End: 2022-04-20

## 2021-10-20 RX ORDER — NORGESTIMATE AND ETHINYL ESTRADIOL 0.25-0.035
1 KIT ORAL DAILY
COMMUNITY
Start: 2021-09-08 | End: 2021-12-03

## 2021-10-20 RX ORDER — ZOLMITRIPTAN 5 MG/1
5 TABLET, FILM COATED ORAL ONCE AS NEEDED
Qty: 9 TABLET | Refills: 0 | Status: SHIPPED | OUTPATIENT
Start: 2021-10-20 | End: 2021-10-21

## 2021-10-20 RX ORDER — OMEPRAZOLE 40 MG/1
CAPSULE, DELAYED RELEASE ORAL
COMMUNITY
Start: 2021-10-19 | End: 2022-04-20

## 2021-10-20 NOTE — PROGRESS NOTES
Headaches:  Has tried Imitrex in the past without help.  Nurtec works well but insurance doesn't want to cover currently.Chief Complaint  Headache (f/u)    Subjective        Roselyn Muniz presents to [facility]  Headaches:  Has tried Imitrex in the past without help.  Nurtec works well but insurance doesn't want to cover currently since hasn't tried 3 alternatives.  Has tried Imitrex and Maxalt and didn't seem to help. Concerned about hypertension with 5HT class.      Hypertension:  Doing well on medication but blood pressure up a little today and will recheck.          Past History:    Medical History: has a past medical history of Allergies, HBP (high blood pressure), Migraine, Throat clearing, and Throat fullness.     Surgical History: has no past surgical history on file.     Family History: family history includes Cancer (age of onset: 75) in her maternal grandmother; Cervical cancer (age of onset: 35) in an other family member; Diabetes in her father; Pancreatic cancer in an other family member.     Social History: reports that she has never smoked. She has never used smokeless tobacco. She reports that she does not drink alcohol and does not use drugs.    Allergies: Triptans          Current Outpatient Medications:   •  cetirizine (ZyrTEC Allergy) 10 MG tablet, Zyrtec 10 mg oral tablet take 1 tablet (10 mg) by oral route once daily   Suspended, Disp: , Rfl:   •  Estarylla 0.25-35 MG-MCG per tablet, Take 1 tablet by mouth Daily., Disp: , Rfl:   •  omeprazole (priLOSEC) 40 MG capsule, , Disp: , Rfl:   •  ZOLMitriptan (Zomig) 5 MG tablet, Take 1 tablet by mouth 1 (One) Time As Needed for Migraine for up to 30 days., Disp: 9 tablet, Rfl: 0    Medications Discontinued During This Encounter   Medication Reason   • Rimegepant Sulfate (Nurtec) 75 MG tablet dispersible tablet *Therapy completed         Review of Systems   Constitutional: Negative for fever.   Respiratory: Negative for  "cough and shortness of breath.    Cardiovascular: Negative for chest pain, palpitations and leg swelling.   Neurological: Negative for dizziness, weakness, numbness and headache.        Objective         Vitals:    10/20/21 0954   BP: 150/70   BP Location: Right arm   Patient Position: Sitting   Cuff Size: Adult   Pulse: 79   Resp: 16   Temp: 98.1 °F (36.7 °C)   TempSrc: Infrared   SpO2: 99%   Weight: 85.9 kg (189 lb 6.4 oz)   Height: 160 cm (63\")     Body mass index is 33.55 kg/m².         Physical Exam  Vitals reviewed.   Constitutional:       Appearance: Normal appearance. She is well-developed.   HENT:      Head: Normocephalic and atraumatic.      Mouth/Throat:      Pharynx: No oropharyngeal exudate.   Eyes:      Conjunctiva/sclera: Conjunctivae normal.      Pupils: Pupils are equal, round, and reactive to light.   Cardiovascular:      Rate and Rhythm: Normal rate and regular rhythm.      Heart sounds: No murmur heard.  No friction rub. No gallop.    Pulmonary:      Effort: Pulmonary effort is normal.      Breath sounds: Normal breath sounds. No wheezing or rhonchi.   Skin:     General: Skin is warm and dry.   Neurological:      Mental Status: She is alert and oriented to person, place, and time.   Psychiatric:         Mood and Affect: Mood and affect normal.         Behavior: Behavior normal.         Thought Content: Thought content normal.         Judgment: Judgment normal.             Result Review :               Assessment and Plan     Diagnoses and all orders for this visit:    1. Other migraine without status migrainosus, not intractable (Primary)  Comments:  If zomig doesn't work will restart Nurtec.  Orders:  -     ZOLMitriptan (Zomig) 5 MG tablet; Take 1 tablet by mouth 1 (One) Time As Needed for Migraine for up to 30 days.  Dispense: 9 tablet; Refill: 0    2. Primary hypertension  -     Comprehensive metabolic panel; Future  -     Lipid Panel w/ Chol/HDL Ratio; Future  -     Urinalysis With Culture If " Indicated -; Future  -     CBC No Differential; Future              Follow Up     Return in about 6 months (around 4/20/2022) for Next scheduled follow up.    Patient was given instructions and counseling regarding her condition or for health maintenance advice. Please see specific information pulled into the AVS if appropriate.

## 2021-10-21 DIAGNOSIS — G43.809 OTHER MIGRAINE WITHOUT STATUS MIGRAINOSUS, NOT INTRACTABLE: Primary | ICD-10-CM

## 2021-12-03 RX ORDER — NORGESTIMATE AND ETHINYL ESTRADIOL 0.25-0.035
KIT ORAL
Qty: 84 TABLET | Refills: 3 | Status: SHIPPED | OUTPATIENT
Start: 2021-12-03 | End: 2022-02-07

## 2021-12-03 NOTE — TELEPHONE ENCOUNTER
Please advise medication   Last visit:10/20/2021  Next visit:4/20/2022      peter - medication is on the outside prescribed list. Doesn't look like we have filled this. Please advise

## 2021-12-20 ENCOUNTER — TELEMEDICINE (OUTPATIENT)
Dept: FAMILY MEDICINE CLINIC | Facility: CLINIC | Age: 26
End: 2021-12-20

## 2021-12-20 DIAGNOSIS — R50.9 FEVER, UNSPECIFIED FEVER CAUSE: Primary | ICD-10-CM

## 2021-12-20 DIAGNOSIS — J01.90 ACUTE NON-RECURRENT SINUSITIS, UNSPECIFIED LOCATION: ICD-10-CM

## 2021-12-20 PROCEDURE — U0004 COV-19 TEST NON-CDC HGH THRU: HCPCS | Performed by: NURSE PRACTITIONER

## 2021-12-20 PROCEDURE — 99213 OFFICE O/P EST LOW 20 MIN: CPT | Performed by: NURSE PRACTITIONER

## 2021-12-20 RX ORDER — FLUTICASONE PROPIONATE 50 MCG
2 SPRAY, SUSPENSION (ML) NASAL DAILY
Qty: 16 G | Refills: 1 | Status: SHIPPED | OUTPATIENT
Start: 2021-12-20 | End: 2021-12-20

## 2021-12-20 RX ORDER — FLUTICASONE PROPIONATE 50 MCG
SPRAY, SUSPENSION (ML) NASAL
Qty: 48 G | Refills: 1 | Status: SHIPPED | OUTPATIENT
Start: 2021-12-20 | End: 2022-04-20

## 2021-12-20 RX ORDER — AZITHROMYCIN 250 MG/1
TABLET, FILM COATED ORAL
Qty: 6 TABLET | Refills: 0 | Status: SHIPPED | OUTPATIENT
Start: 2021-12-20 | End: 2021-12-20

## 2021-12-20 RX ORDER — AZITHROMYCIN 250 MG/1
TABLET, FILM COATED ORAL
Qty: 6 TABLET | Refills: 0 | Status: SHIPPED | OUTPATIENT
Start: 2021-12-20 | End: 2022-02-07

## 2021-12-20 NOTE — PROGRESS NOTES
Chief Complaint  Sinusitis, Headache, Generalized Body Aches, Sore Throat, and Shortness of Breath (congestion)    Subjective        Roselyn Muniz presents to [facility]  Generalized body aches.  States her  came home with the same symptoms previously.  Having green nasal drainage.  Headache sinus congestion.  Has been taking over-the-counter Mucinex and with a decongestant.  And states just shortness of breath with the congestion.          Past History:    Medical History: has a past medical history of Allergies, HBP (high blood pressure), Migraine, Throat clearing, and Throat fullness.     Surgical History: has no past surgical history on file.     Family History: family history includes Cancer (age of onset: 75) in her maternal grandmother; Cervical cancer (age of onset: 35) in an other family member; Diabetes in her father; Pancreatic cancer in an other family member.     Social History: reports that she has never smoked. She has never used smokeless tobacco. She reports that she does not drink alcohol and does not use drugs.    Allergies: Triptans          Current Outpatient Medications:   •  cetirizine (ZyrTEC Allergy) 10 MG tablet, Zyrtec 10 mg oral tablet take 1 tablet (10 mg) by oral route once daily   Suspended, Disp: , Rfl:   •  Estarylla 0.25-35 MG-MCG per tablet, TAKE 1 TABLET BY MOUTH EVERY DAY, Disp: 84 tablet, Rfl: 3  •  omeprazole (priLOSEC) 40 MG capsule, , Disp: , Rfl:   •  Rimegepant Sulfate (NURTEC) 75 MG tablet dispersible tablet, Take 1 tablet by mouth Daily As Needed (migraine headache)., Disp: 24 tablet, Rfl: 1  •  azithromycin (Zithromax Z-Anson) 250 MG tablet, Take 2 tablets by mouth on day 1, then 1 tablet daily on days 2-5, Disp: 6 tablet, Rfl: 0  •  fluticasone (Flonase) 50 MCG/ACT nasal spray, 2 sprays into the nostril(s) as directed by provider Daily., Disp: 16 g, Rfl: 1    Medications Discontinued During This Encounter   Medication Reason   •  azithromycin (Zithromax Z-Anson) 250 MG tablet    • fluticasone (Flonase) 50 MCG/ACT nasal spray          Review of Systems   Constitutional: Negative for fever.   Respiratory: Negative for cough and shortness of breath.    Cardiovascular: Negative for chest pain, palpitations and leg swelling.   Neurological: Negative for dizziness, weakness, numbness and headache.        Objective         There were no vitals filed for this visit.  There is no height or weight on file to calculate BMI.         Physical Exam  Vitals reviewed.   Constitutional:       Appearance: Normal appearance. She is well-developed.   HENT:      Head: Normocephalic and atraumatic.      Mouth/Throat:      Pharynx: No oropharyngeal exudate.   Eyes:      Pupils: Pupils are equal, round, and reactive to light.   Pulmonary:      Effort: Pulmonary effort is normal.   Neurological:      Mental Status: She is alert and oriented to person, place, and time.   Psychiatric:         Mood and Affect: Mood and affect normal.         Behavior: Behavior normal.         Thought Content: Thought content normal.         Judgment: Judgment normal.             Result Review :               Assessment and Plan     Diagnoses and all orders for this visit:    1. Fever, unspecified fever cause (Primary)  -     COVID-19,CEPHEID/KAMILA,COR/KRIS/PAD/GHAZALA IN-HOUSE(OR EMERGENT/ADD-ON),NP SWAB IN TRANSPORT MEDIA 3-4 HR TAT, RT-PCR - Swab, Nasopharynx; Future    2. Acute non-recurrent sinusitis, unspecified location  -     Discontinue: azithromycin (Zithromax Z-Anson) 250 MG tablet; Take 2 tablets by mouth on day 1, then 1 tablet daily on days 2-5  Dispense: 6 tablet; Refill: 0  -     Discontinue: fluticasone (Flonase) 50 MCG/ACT nasal spray; 2 sprays into the nostril(s) as directed by provider Daily.  Dispense: 16 g; Refill: 1  -     fluticasone (Flonase) 50 MCG/ACT nasal spray; 2 sprays into the nostril(s) as directed by provider Daily.  Dispense: 16 g; Refill: 1  -     azithromycin  (Zithromax Z-Anson) 250 MG tablet; Take 2 tablets by mouth on day 1, then 1 tablet daily on days 2-5  Dispense: 6 tablet; Refill: 0        Video visit with patient in her home from the office by mecca lasting 10 minutes.      Follow Up     Return if symptoms worsen or fail to improve.    Patient was given instructions and counseling regarding her condition or for health maintenance advice. Please see specific information pulled into the AVS if appropriate.          I have reviewed and confirmed nurses' notes for patient's medications, allergies, medical history, and surgical history.

## 2021-12-21 LAB — SARS-COV-2 RNA PNL SPEC NAA+PROBE: NOT DETECTED

## 2022-01-05 DIAGNOSIS — G43.809 OTHER MIGRAINE WITHOUT STATUS MIGRAINOSUS, NOT INTRACTABLE: ICD-10-CM

## 2022-02-07 ENCOUNTER — LAB (OUTPATIENT)
Dept: LAB | Facility: HOSPITAL | Age: 27
End: 2022-02-07

## 2022-02-07 ENCOUNTER — OFFICE VISIT (OUTPATIENT)
Dept: FAMILY MEDICINE CLINIC | Facility: CLINIC | Age: 27
End: 2022-02-07

## 2022-02-07 VITALS
OXYGEN SATURATION: 100 % | HEIGHT: 63 IN | DIASTOLIC BLOOD PRESSURE: 82 MMHG | BODY MASS INDEX: 34.2 KG/M2 | WEIGHT: 193 LBS | HEART RATE: 114 BPM | TEMPERATURE: 98 F | RESPIRATION RATE: 16 BRPM | SYSTOLIC BLOOD PRESSURE: 112 MMHG

## 2022-02-07 DIAGNOSIS — Z32.01 POSITIVE PREGNANCY TEST: ICD-10-CM

## 2022-02-07 DIAGNOSIS — R10.819 SUPRAPUBIC TENDERNESS: ICD-10-CM

## 2022-02-07 DIAGNOSIS — I10 PRIMARY HYPERTENSION: ICD-10-CM

## 2022-02-07 DIAGNOSIS — R10.9 ABDOMINAL CRAMPING: ICD-10-CM

## 2022-02-07 DIAGNOSIS — Z32.01 POSITIVE PREGNANCY TEST: Primary | ICD-10-CM

## 2022-02-07 LAB
ALBUMIN SERPL-MCNC: 4.3 G/DL (ref 3.5–5.2)
ALBUMIN/GLOB SERPL: 1.1 G/DL
ALP SERPL-CCNC: 97 U/L (ref 39–117)
ALT SERPL W P-5'-P-CCNC: 42 U/L (ref 1–33)
AMORPH URATE CRY URNS QL MICRO: ABNORMAL /HPF
ANION GAP SERPL CALCULATED.3IONS-SCNC: 11.4 MMOL/L (ref 5–15)
AST SERPL-CCNC: 20 U/L (ref 1–32)
BACTERIA UR QL AUTO: ABNORMAL /HPF
BILIRUB SERPL-MCNC: 0.5 MG/DL (ref 0–1.2)
BILIRUB UR QL STRIP: NEGATIVE
BUN SERPL-MCNC: 9 MG/DL (ref 6–20)
BUN/CREAT SERPL: 10.7 (ref 7–25)
CALCIUM SPEC-SCNC: 9.9 MG/DL (ref 8.6–10.5)
CHLORIDE SERPL-SCNC: 104 MMOL/L (ref 98–107)
CHOLEST SERPL-MCNC: 268 MG/DL (ref 0–200)
CLARITY UR: ABNORMAL
CO2 SERPL-SCNC: 19.6 MMOL/L (ref 22–29)
COLOR UR: ABNORMAL
CREAT SERPL-MCNC: 0.84 MG/DL (ref 0.57–1)
DEPRECATED RDW RBC AUTO: 42.3 FL (ref 37–54)
ERYTHROCYTE [DISTWIDTH] IN BLOOD BY AUTOMATED COUNT: 13.3 % (ref 12.3–15.4)
GFR SERPL CREATININE-BSD FRML MDRD: 81 ML/MIN/1.73
GLOBULIN UR ELPH-MCNC: 3.8 GM/DL
GLUCOSE SERPL-MCNC: 75 MG/DL (ref 65–99)
GLUCOSE UR STRIP-MCNC: NEGATIVE MG/DL
HCG SERPL QL: POSITIVE
HCT VFR BLD AUTO: 40.3 % (ref 34–46.6)
HDLC SERPL QL: 5.7
HDLC SERPL-MCNC: 47 MG/DL (ref 40–60)
HGB BLD-MCNC: 13.2 G/DL (ref 12–15.9)
HGB UR QL STRIP.AUTO: NEGATIVE
HYALINE CASTS UR QL AUTO: ABNORMAL /LPF
KETONES UR QL STRIP: ABNORMAL
LDLC SERPL CALC-MCNC: 207 MG/DL (ref 0–100)
LEUKOCYTE ESTERASE UR QL STRIP.AUTO: NEGATIVE
MCH RBC QN AUTO: 28.7 PG (ref 26.6–33)
MCHC RBC AUTO-ENTMCNC: 32.8 G/DL (ref 31.5–35.7)
MCV RBC AUTO: 87.6 FL (ref 79–97)
NITRITE UR QL STRIP: NEGATIVE
PH UR STRIP.AUTO: 5.5 [PH] (ref 5–8)
PLATELET # BLD AUTO: 398 10*3/MM3 (ref 140–450)
PMV BLD AUTO: 11.8 FL (ref 6–12)
POTASSIUM SERPL-SCNC: 4.2 MMOL/L (ref 3.5–5.2)
PROT SERPL-MCNC: 8.1 G/DL (ref 6–8.5)
PROT UR QL STRIP: ABNORMAL
RBC # BLD AUTO: 4.6 10*6/MM3 (ref 3.77–5.28)
RBC # UR STRIP: ABNORMAL /HPF
REF LAB TEST METHOD: ABNORMAL
SODIUM SERPL-SCNC: 135 MMOL/L (ref 136–145)
SP GR UR STRIP: 1.03 (ref 1–1.03)
SQUAMOUS #/AREA URNS HPF: ABNORMAL /HPF
TRIGL SERPL-MCNC: 84 MG/DL (ref 0–150)
UROBILINOGEN UR QL STRIP: ABNORMAL
VLDLC SERPL-MCNC: 14 MG/DL (ref 5–40)
WBC # UR STRIP: ABNORMAL /HPF
WBC NRBC COR # BLD: 15.58 10*3/MM3 (ref 3.4–10.8)

## 2022-02-07 PROCEDURE — 80053 COMPREHEN METABOLIC PANEL: CPT

## 2022-02-07 PROCEDURE — 99213 OFFICE O/P EST LOW 20 MIN: CPT | Performed by: NURSE PRACTITIONER

## 2022-02-07 PROCEDURE — 84703 CHORIONIC GONADOTROPIN ASSAY: CPT

## 2022-02-07 PROCEDURE — 85027 COMPLETE CBC AUTOMATED: CPT

## 2022-02-07 PROCEDURE — 36415 COLL VENOUS BLD VENIPUNCTURE: CPT

## 2022-02-07 PROCEDURE — 81001 URINALYSIS AUTO W/SCOPE: CPT

## 2022-02-07 PROCEDURE — 80061 LIPID PANEL: CPT

## 2022-02-07 NOTE — PROGRESS NOTES
Chief Complaint  Possible Pregnancy (3 positive home tests. /30-1/2/22)    Subjective        Roselyn Muniz presents to [facility]  Has had 3 home positive pregnancy tests.  LMP 1/2/22.  Last Nurtec was 1/5/22    Possible Pregnancy  Pertinent negatives include no chest pain, coughing, fever, numbness or weakness.         Past History:    Medical History: has a past medical history of Allergies, HBP (high blood pressure), Migraine, Throat clearing, and Throat fullness.     Surgical History: has no past surgical history on file.     Family History: family history includes Cancer (age of onset: 75) in her maternal grandmother; Cervical cancer (age of onset: 35) in an other family member; Diabetes in her father; Pancreatic cancer in an other family member.     Social History: reports that she has never smoked. She has never used smokeless tobacco. She reports that she does not drink alcohol and does not use drugs.    Allergies: Triptans          Current Outpatient Medications:   •  Rimegepant Sulfate (NURTEC) 75 MG tablet dispersible tablet, Take 1 tablet by mouth Daily As Needed (migraine headache)., Disp: 24 tablet, Rfl: 1  •  cetirizine (ZyrTEC Allergy) 10 MG tablet, Zyrtec 10 mg oral tablet take 1 tablet (10 mg) by oral route once daily   Suspended, Disp: , Rfl:   •  fluticasone (FLONASE) 50 MCG/ACT nasal spray, INSTILL 2 SPRAYS INTO EACH NOSTRIL EVERY DAY, Disp: 48 g, Rfl: 1  •  omeprazole (priLOSEC) 40 MG capsule, , Disp: , Rfl:     Medications Discontinued During This Encounter   Medication Reason   • azithromycin (Zithromax Z-Anson) 250 MG tablet *Therapy completed   • Estarylla 0.25-35 MG-MCG per tablet *Therapy completed         Review of Systems   Constitutional: Negative for fever.   Respiratory: Negative for cough and shortness of breath.    Cardiovascular: Negative for chest pain, palpitations and leg swelling.   Neurological: Negative for dizziness, weakness, numbness  "and headache.        Objective         Vitals:    02/07/22 1106   BP: 112/82   BP Location: Right arm   Patient Position: Sitting   Cuff Size: Adult   Pulse: 114   Resp: 16   Temp: 98 °F (36.7 °C)   TempSrc: Infrared   SpO2: 100%   Weight: 87.5 kg (193 lb)   Height: 160 cm (63\")     Body mass index is 34.19 kg/m².         Physical Exam  Vitals reviewed.   Constitutional:       Appearance: Normal appearance. She is well-developed.   HENT:      Head: Normocephalic and atraumatic.      Mouth/Throat:      Pharynx: No oropharyngeal exudate.   Eyes:      Conjunctiva/sclera: Conjunctivae normal.      Pupils: Pupils are equal, round, and reactive to light.   Cardiovascular:      Rate and Rhythm: Normal rate and regular rhythm.      Heart sounds: No murmur heard.  No friction rub. No gallop.    Pulmonary:      Effort: Pulmonary effort is normal.      Breath sounds: Normal breath sounds. No wheezing or rhonchi.   Abdominal:      General: Bowel sounds are normal.      Palpations: Abdomen is soft.      Tenderness: There is abdominal tenderness in the suprapubic area.       Skin:     General: Skin is warm and dry.   Neurological:      Mental Status: She is alert and oriented to person, place, and time.   Psychiatric:         Mood and Affect: Mood and affect normal.         Behavior: Behavior normal.         Thought Content: Thought content normal.         Judgment: Judgment normal.             Result Review :               Assessment and Plan     Diagnoses and all orders for this visit:    1. Positive pregnancy test (Primary)  -     hCG, Serum, Qualitative; Future  -     US Pelvis Complete; Future    2. Abdominal cramping  -     US Pelvis Complete; Future    3. Suprapubic tenderness  -     US Pelvis Complete; Future      Informed to hold medication until sees ob.        Follow Up     Return if symptoms worsen or fail to improve.    Patient was given instructions and counseling regarding her condition or for health maintenance " advice. Please see specific information pulled into the AVS if appropriate.

## 2022-02-08 DIAGNOSIS — D72.829 LEUKOCYTOSIS, UNSPECIFIED TYPE: Primary | ICD-10-CM

## 2022-02-10 ENCOUNTER — TELEPHONE (OUTPATIENT)
Dept: FAMILY MEDICINE CLINIC | Facility: CLINIC | Age: 27
End: 2022-02-10

## 2022-02-10 DIAGNOSIS — Z34.90 PREGNANCY, UNSPECIFIED GESTATIONAL AGE: Primary | ICD-10-CM

## 2022-02-10 NOTE — TELEPHONE ENCOUNTER
Pended order for Dr. Reddy, discussed getting u/s, she may go to [place] as it will be cheaper and she can walk in.

## 2022-02-10 NOTE — TELEPHONE ENCOUNTER
----- Message from Roselyn Muniz sent at 2/10/2022 10:15 AM EST -----  Regarding: Order?  Irene Leal, I called [facility] and they said they could only do a pelvic exam there? But I am already scheduled for that somewhere else. They said that is the only reason they would do anything would be for a pelvic exam. So do I go do that at [facility] or go where I am already scheduled? Also I guess I need to get an OB appointment. Do I need to be referred or can I just call to set that up?

## 2022-03-07 ENCOUNTER — APPOINTMENT (OUTPATIENT)
Dept: ULTRASOUND IMAGING | Facility: HOSPITAL | Age: 27
End: 2022-03-07

## 2022-04-20 ENCOUNTER — OFFICE VISIT (OUTPATIENT)
Dept: FAMILY MEDICINE CLINIC | Facility: CLINIC | Age: 27
End: 2022-04-20

## 2022-04-20 VITALS
HEART RATE: 95 BPM | DIASTOLIC BLOOD PRESSURE: 72 MMHG | RESPIRATION RATE: 16 BRPM | HEIGHT: 63 IN | TEMPERATURE: 97.7 F | OXYGEN SATURATION: 99 % | SYSTOLIC BLOOD PRESSURE: 130 MMHG | WEIGHT: 188.8 LBS | BODY MASS INDEX: 33.45 KG/M2

## 2022-04-20 DIAGNOSIS — G43.809 OTHER MIGRAINE WITHOUT STATUS MIGRAINOSUS, NOT INTRACTABLE: Primary | ICD-10-CM

## 2022-04-20 DIAGNOSIS — Z3A.16 16 WEEKS GESTATION OF PREGNANCY: ICD-10-CM

## 2022-04-20 PROBLEM — Z82.79 FAMILY HISTORY OF CONGENITAL HEART DEFECT: Status: ACTIVE | Noted: 2022-03-23

## 2022-04-20 PROBLEM — Z34.90 SUPERVISION OF NORMAL PREGNANCY: Status: ACTIVE | Noted: 2022-03-23

## 2022-04-20 PROBLEM — E66.9 OBESITY: Status: ACTIVE | Noted: 2022-03-23

## 2022-04-20 PROCEDURE — 99213 OFFICE O/P EST LOW 20 MIN: CPT | Performed by: NURSE PRACTITIONER

## 2022-04-20 RX ORDER — PRENATAL VIT/IRON FUM/FOLIC AC 27MG-0.8MG
TABLET ORAL
COMMUNITY

## 2022-04-20 NOTE — PROGRESS NOTES
Answers for HPI/ROS submitted by the patient on 4/14/2022  Please describe your symptoms.: No symptoms just check up  Have you had these symptoms before?: No  How long have you been having these symptoms?: 1-4 days  What is the primary reason for your visit?: Other    Chief Complaint  Annual Exam and Headache (F/u will take magnesium)    Subjective        Roselyn Muniz presents to [facility]  Headaches doing well but has started after a few months of pregnancy.          Past History:    Medical History: has a past medical history of Allergies, HBP (high blood pressure), Migraine, Throat clearing, and Throat fullness.     Surgical History: has no past surgical history on file.     Family History: family history includes Cancer (age of onset: 75) in her maternal grandmother; Cervical cancer (age of onset: 35) in an other family member; Diabetes in her father; Pancreatic cancer in an other family member.     Social History: reports that she has never smoked. She has never used smokeless tobacco. She reports that she does not drink alcohol and does not use drugs.    Allergies: Azithromycin and Triptans          Current Outpatient Medications:   •  ASPIRIN 81 PO, Take  by mouth., Disp: , Rfl:   •  Prenatal Vit-Fe Fumarate-FA (prenatal vitamin 27-0.8) 27-0.8 MG tablet tablet, Take  by mouth., Disp: , Rfl:     Medications Discontinued During This Encounter   Medication Reason   • cetirizine (ZyrTEC Allergy) 10 MG tablet Pregnancy   • fluticasone (FLONASE) 50 MCG/ACT nasal spray Pregnancy   • omeprazole (priLOSEC) 40 MG capsule Pregnancy   • Rimegepant Sulfate (NURTEC) 75 MG tablet dispersible tablet Pregnancy         Review of Systems   Constitutional: Negative for fever.   Respiratory: Negative for cough and shortness of breath.    Cardiovascular: Negative for chest pain, palpitations and leg swelling.   Neurological: Negative for dizziness, weakness, numbness and headache.     "    Objective         Vitals:    04/20/22 0749   BP: 130/72   BP Location: Right arm   Patient Position: Sitting   Cuff Size: Adult   Pulse: 95   Resp: 16   Temp: 97.7 °F (36.5 °C)   TempSrc: Infrared   SpO2: 99%   Weight: 85.6 kg (188 lb 12.8 oz)   Height: 160 cm (62.99\")     Body mass index is 33.45 kg/m².         Physical Exam  Vitals reviewed.   Constitutional:       Appearance: Normal appearance. She is well-developed.   HENT:      Head: Normocephalic and atraumatic.      Mouth/Throat:      Pharynx: No oropharyngeal exudate.   Eyes:      Conjunctiva/sclera: Conjunctivae normal.      Pupils: Pupils are equal, round, and reactive to light.   Cardiovascular:      Rate and Rhythm: Normal rate and regular rhythm.      Heart sounds: Normal heart sounds. No murmur heard.    No friction rub. No gallop.   Pulmonary:      Effort: Pulmonary effort is normal.      Breath sounds: Normal breath sounds. No wheezing or rhonchi.   Skin:     General: Skin is warm and dry.   Neurological:      Mental Status: She is alert and oriented to person, place, and time.   Psychiatric:         Mood and Affect: Mood and affect normal.         Behavior: Behavior normal.         Thought Content: Thought content normal.         Judgment: Judgment normal.             Result Review :               Assessment and Plan     Diagnoses and all orders for this visit:    1. Other migraine without status migrainosus, not intractable (Primary)  Comments:  discussed dry needling for headaches if would like us to put in.  She will let us know.    2. 16 weeks gestation of pregnancy  Comments:  not having issues.              Follow Up     Return in about 6 months (around 10/20/2022).    Patient was given instructions and counseling regarding her condition or for health maintenance advice. Please see specific information pulled into the AVS if appropriate.         "

## 2022-10-20 ENCOUNTER — OFFICE VISIT (OUTPATIENT)
Dept: FAMILY MEDICINE CLINIC | Facility: CLINIC | Age: 27
End: 2022-10-20

## 2022-10-20 VITALS
RESPIRATION RATE: 16 BRPM | TEMPERATURE: 97.4 F | SYSTOLIC BLOOD PRESSURE: 142 MMHG | DIASTOLIC BLOOD PRESSURE: 90 MMHG | HEIGHT: 63 IN | WEIGHT: 189 LBS | HEART RATE: 101 BPM | BODY MASS INDEX: 33.49 KG/M2 | OXYGEN SATURATION: 98 %

## 2022-10-20 DIAGNOSIS — G89.29 CHRONIC NONINTRACTABLE HEADACHE, UNSPECIFIED HEADACHE TYPE: Primary | ICD-10-CM

## 2022-10-20 DIAGNOSIS — R51.9 CHRONIC NONINTRACTABLE HEADACHE, UNSPECIFIED HEADACHE TYPE: Primary | ICD-10-CM

## 2022-10-20 PROCEDURE — 99213 OFFICE O/P EST LOW 20 MIN: CPT | Performed by: NURSE PRACTITIONER

## 2022-10-20 RX ORDER — IBUPROFEN 800 MG/1
800 TABLET ORAL EVERY 6 HOURS PRN
Qty: 90 TABLET | Refills: 0 | Status: SHIPPED | OUTPATIENT
Start: 2022-10-20

## 2022-10-20 NOTE — PROGRESS NOTES
Answers for HPI/ROS submitted by the patient on 10/16/2022  Please describe your symptoms.: Routine checkup  Have you had these symptoms before?: Yes  How long have you been having these symptoms?: 1-4 days  Please list any medications you are currently taking for this condition.: N/a  Please describe any probable cause for these symptoms. : N/a  What is the primary reason for your visit?: Other    Chief Complaint  Headache (Has had a couple the first week and three last week)    Subjective        Roselyn Muniz presents to [facility]  History of Present Illness  Headaches:  Had 5 in the last 2 weeks.  States that on her right side behind ete.  States last week had 3 days in a row.  Ibuprofen helps but unable to take nurtec because breastfeeding.  States like her headaches that she has had in the past.  Has ibuprofen 800mg to take.    Headache        Past History:    Medical History: has a past medical history of Allergies, HBP (high blood pressure), Migraine, Throat clearing, and Throat fullness.     Surgical History: has no past surgical history on file.     Family History: family history includes Cancer in her maternal grandmother; Cervical cancer (age of onset: 35) in an other family member; Diabetes in her father; Pancreatic cancer in an other family member.     Social History: reports that she has never smoked. She has never used smokeless tobacco. She reports that she does not drink alcohol and does not use drugs.    Allergies: Azithromycin and Triptans          Current Outpatient Medications:   •  ibuprofen (ADVIL,MOTRIN) 800 MG tablet, Take 1 tablet by mouth Every 6 (Six) Hours As Needed for Mild Pain., Disp: 90 tablet, Rfl: 0  •  Prenatal Vit-Fe Fumarate-FA (prenatal vitamin 27-0.8) 27-0.8 MG tablet tablet, Take  by mouth., Disp: , Rfl:     Medications Discontinued During This Encounter   Medication Reason   • ASPIRIN 81 PO *Therapy completed         Review of Systems  "  Constitutional: Negative for fever.   Respiratory: Negative for cough and shortness of breath.    Cardiovascular: Negative for chest pain, palpitations and leg swelling.   Neurological: Negative for dizziness, weakness, numbness and headache.        Objective         Vitals:    10/20/22 1305   BP: 142/90   BP Location: Right arm   Patient Position: Sitting   Cuff Size: Large Adult   Pulse: 101   Resp: 16   Temp: 97.4 °F (36.3 °C)   TempSrc: Temporal   SpO2: 98%   Weight: 85.7 kg (189 lb)   Height: 160 cm (62.99\")     Body mass index is 33.49 kg/m².         Physical Exam  Vitals reviewed.   Constitutional:       Appearance: Normal appearance. She is well-developed.   HENT:      Head: Normocephalic and atraumatic.      Mouth/Throat:      Pharynx: No oropharyngeal exudate.   Eyes:      Conjunctiva/sclera: Conjunctivae normal.      Pupils: Pupils are equal, round, and reactive to light.   Cardiovascular:      Rate and Rhythm: Normal rate and regular rhythm.      Heart sounds: Normal heart sounds. No murmur heard.    No friction rub. No gallop.   Pulmonary:      Effort: Pulmonary effort is normal.      Breath sounds: Normal breath sounds. No wheezing or rhonchi.   Skin:     General: Skin is warm and dry.   Neurological:      Mental Status: She is alert and oriented to person, place, and time.   Psychiatric:         Mood and Affect: Mood and affect normal.         Behavior: Behavior normal.         Thought Content: Thought content normal.         Judgment: Judgment normal.             Result Review :               Assessment and Plan     Diagnoses and all orders for this visit:    1. Chronic nonintractable headache, unspecified headache type (Primary)  -     ibuprofen (ADVIL,MOTRIN) 800 MG tablet; Take 1 tablet by mouth Every 6 (Six) Hours As Needed for Mild Pain.  Dispense: 90 tablet; Refill: 0              Follow Up     Return in about 6 months (around 4/20/2023).    Patient was given instructions and counseling " regarding her condition or for health maintenance advice. Please see specific information pulled into the AVS if appropriate.

## 2022-12-13 ENCOUNTER — PATIENT MESSAGE (OUTPATIENT)
Dept: FAMILY MEDICINE CLINIC | Facility: CLINIC | Age: 27
End: 2022-12-13

## 2022-12-13 DIAGNOSIS — M79.672 PAIN OF LEFT HEEL: Primary | ICD-10-CM

## 2022-12-14 NOTE — TELEPHONE ENCOUNTER
From: Roselyn Muniz  To: AUDREY Buchanan  Sent: 12/13/2022 11:05 AM EST  Subject: Foot doctor referral     Hello,    I have been having ongoing pain for several months in the heel of my right foot. I didn’t think to mention it when I had my last checkup. I thought it might have been put on by part of being pregnant, however the pain is still there. It is most painful in the morning when waking up and starting the day, however pain remains throughout the day as I am on my feet most of the day. I think it may be plantar fasciitis, per what I have researched. Is it possible to put in a referral to see a foot doctor? I will be going back to work soon and will be on my feet even more! Thank you!    Roselyn Muniz  [phone]

## 2022-12-16 ENCOUNTER — OFFICE VISIT (OUTPATIENT)
Dept: PODIATRY | Facility: CLINIC | Age: 27
End: 2022-12-16

## 2022-12-16 VITALS
BODY MASS INDEX: 34.94 KG/M2 | SYSTOLIC BLOOD PRESSURE: 126 MMHG | HEIGHT: 63 IN | WEIGHT: 197.2 LBS | DIASTOLIC BLOOD PRESSURE: 68 MMHG | HEART RATE: 85 BPM | OXYGEN SATURATION: 100 % | TEMPERATURE: 97.8 F

## 2022-12-16 DIAGNOSIS — M21.6X9 EQUINUS DEFORMITY OF FOOT: ICD-10-CM

## 2022-12-16 DIAGNOSIS — M79.671 RIGHT FOOT PAIN: ICD-10-CM

## 2022-12-16 DIAGNOSIS — M72.2 PLANTAR FASCIITIS: Primary | ICD-10-CM

## 2022-12-16 PROCEDURE — 99203 OFFICE O/P NEW LOW 30 MIN: CPT | Performed by: PODIATRIST

## 2022-12-16 PROCEDURE — 20550 NJX 1 TENDON SHEATH/LIGAMENT: CPT | Performed by: PODIATRIST

## 2022-12-16 RX ORDER — BUPIVACAINE HYDROCHLORIDE 2.5 MG/ML
0.5 INJECTION, SOLUTION INFILTRATION; PERINEURAL ONCE
Status: COMPLETED | OUTPATIENT
Start: 2022-12-16 | End: 2022-12-16

## 2022-12-16 RX ORDER — TRIAMCINOLONE ACETONIDE 40 MG/ML
20 INJECTION, SUSPENSION INTRA-ARTICULAR; INTRAMUSCULAR ONCE
Status: COMPLETED | OUTPATIENT
Start: 2022-12-16 | End: 2022-12-16

## 2022-12-16 RX ORDER — ACETAMINOPHEN AND CODEINE PHOSPHATE 120; 12 MG/5ML; MG/5ML
1 SOLUTION ORAL DAILY
COMMUNITY
Start: 2022-11-23

## 2022-12-16 RX ORDER — TRIAMCINOLONE ACETONIDE 40 MG/ML
20 INJECTION, SUSPENSION INTRA-ARTICULAR; INTRAMUSCULAR ONCE
Status: CANCELLED | OUTPATIENT
Start: 2022-12-16 | End: 2022-12-16

## 2022-12-16 RX ORDER — BUPIVACAINE HYDROCHLORIDE 2.5 MG/ML
0.5 INJECTION, SOLUTION INFILTRATION; PERINEURAL ONCE
Status: CANCELLED | OUTPATIENT
Start: 2022-12-16 | End: 2022-12-16

## 2022-12-16 RX ADMIN — TRIAMCINOLONE ACETONIDE 20 MG: 40 INJECTION, SUSPENSION INTRA-ARTICULAR; INTRAMUSCULAR at 16:02

## 2022-12-16 RX ADMIN — BUPIVACAINE HYDROCHLORIDE 0.5 ML: 2.5 INJECTION, SOLUTION INFILTRATION; PERINEURAL at 16:01

## 2022-12-16 NOTE — PROGRESS NOTES
[facility] - PODIATRY    Today's Date: 12/16/22    Patient Name: Roselyn Muniz  MRN: [id number]  CSN: [id number]  PCP: Tomasz Dasilva APRN,   Referring Provider: Tomasz Dasilva APRN    SUBJECTIVE     Chief Complaint   Patient presents with   • Right Foot - Pain, Establish Care     Pain in right heel than travels up from arch. started a couple months ago.     HPI: Roselyn Muniz, a 27 y.o.female, comes to clinic.    Has been going on for couple months recently had a baby approximately 10 weeks ago.    Location: Right heel    Duration: When on feet  Onset:  gradual    Nature:  achy, sharp, shooting    Aggravating factors:  Patient describes morning bilateral heel pain as stabbing, burning, or aching. This pain usually subsides throughout the day, however it returns after periods of rest and sitting, when standing back up on their feet, and again the next morning.      Previous Treatment: Discussed proper shoegear for the patient's feet and medical condition.  Patient states understanding and agreement with this plan.    Patient denies any fevers, chills, nausea, vomiting, shortness of breath, nor any other constitutional signs nor symptoms.    No other pedal complaints at this time.    Past Medical History:   Diagnosis Date   • Allergies    • Anemia     During pregnancy   • Difficulty walking 2022   • HBP (high blood pressure)    • High arches    • Migraine    • Plantar fasciitis June 2022   • Throat clearing    • Throat fullness      History reviewed. No pertinent surgical history.  Family History   Problem Relation Age of Onset   • Diabetes Father    • Cancer Maternal Grandmother    • Pancreatic cancer Other         Grandparents   • Cervical cancer Other 35        Aunt     Social History     Socioeconomic History   • Marital status:    Tobacco Use   • Smoking status: Never   • Smokeless tobacco: Never   Vaping Use   • Vaping Use: Never used   Substance and Sexual Activity   • Alcohol use: Never    • Drug use: Never   • Sexual activity: Yes     Partners: Male     Birth control/protection: Pill, Other     Allergies   Allergen Reactions   • Azithromycin Other (See Comments)   • Triptans Palpitations     Current Outpatient Medications   Medication Sig Dispense Refill   • ibuprofen (ADVIL,MOTRIN) 800 MG tablet Take 1 tablet by mouth Every 6 (Six) Hours As Needed for Mild Pain. 90 tablet 0   • norethindrone (MICRONOR) 0.35 MG tablet Take 1 tablet by mouth Daily.     • benzonatate (TESSALON) 200 MG capsule Take 1 capsule by mouth 3 (Three) Times a Day As Needed for Cough. 30 capsule 0   • Chlorcyclizine-Pseudoephed (Stahist AD) 25-60 MG tablet Take 1 tablet by mouth 2 (Two) Times a Day As Needed (Nasal congestion and runny nose). 20 tablet 0   • Prenatal Vit-Fe Fumarate-FA (prenatal vitamin 27-0.8) 27-0.8 MG tablet tablet Take  by mouth.       No current facility-administered medications for this visit.     Review of Systems   Musculoskeletal:        Right heel pain       OBJECTIVE     Vitals:    12/16/22 1515   BP: 126/68   Pulse: 85   Temp: 97.8 °F (36.6 °C)   SpO2: 100%       PHYSICAL EXAM     Foot/Ankle Exam:       General:   Appearance: appears stated age and healthy    Orientation: AAOx3    Affect: appropriate    Gait: unimpaired    Shoe Gear:  Casual shoes    VASCULAR      Right Foot Vascularity   Normal vascular exam    Dorsalis pedis:  2+  Posterior tibial:  2+  Skin Temperature: warm    Edema Grading:  None  CFT:  < 3 seconds  Pedal Hair Growth:  Present  Varicosities: none       Left Foot Vascularity   Normal vascular exam    Dorsalis pedis:  2+  Posterior tibial:  2+  Skin Temperature: warm    Edema Grading:  None  CFT:  < 3 seconds  Pedal Hair Growth:  Present  Varicosities: none        NEUROLOGIC     Right Foot Neurologic   Normal sensation    Light touch sensation:  Normal  Vibratory sensation:  Normal  Hot/Cold sensation: normal    Protective Sensation using Walters-Melissa Monofilament:  10      "Left Foot Neurologic   Normal sensation    Light touch sensation:  Normal  Vibratory sensation:  Normal  Hot/cold sensation: normal    Protective Sensation using Arcadia-Melissa Monofilament:  10     MUSCULOSKELETAL      Right Foot Musculoskeletal   Tenderness: plantar fascia and plantar heel       MUSCLE STRENGTH     Right Foot Muscle Strength   Foot dorsiflexion:  4  Foot plantar flexion:  4  Foot inversion:  4  Foot eversion:  4     Left Foot Muscle Strength   Foot dorsiflexion:  4  Foot plantar flexion:  4  Foot inversion:  4  Foot eversion:  4     RANGE OF MOTION      Right Foot Range of Motion   Foot and ankle ROM within normal limits       Left Foot Range of Motion   Foot and ankle ROM within normal limits       DERMATOLOGIC     Right Foot Dermatologic   Skin: skin intact    Nails: normal       Left Foot Dermatologic   Skin: skin intact    Nails: normal        RADIOLOGY:    3 views of right foot were taken.  Right foot pain  3 views of right foot. No periosteal reactions nor osteolytic changes seen.  No occult fractures seen or dislocations.  Right heel spur noted.    ASSESSMENT/PLAN     Diagnoses and all orders for this visit:    1. Plantar fasciitis (Primary)    2. Right foot pain    3. Equinus deformity of foot      Comprehensive lower extremity examination and evaluation was performed.    Discussed findings and treatment plan including risks, benefits, and treatment options with patient in detail. Patient agreed with treatment plan.    Plantar Fasciits Injection:    Date/Time: 12/16/2022  Performed by: Ciro Em DPM  Authorized by: Ciro Em DPM     Consent: Verbal consent obtained. Written consent obtained.  Risks and benefits: risks, benefits and alternatives were discussed  Consent given by: patient  Patient identity confirmed: verbally with patient  Indications: pain relief    Injection site: Right heel.    Sedation:  none    Patient position: sitting  Needle size: 27 G, 1 1/4\" in " length  Injection medications:  0.5 ml 0.25% Marcaine plain, 0.5 ml Kenalog 40 mg/ml, and 0.5 ml Decadron 4 mg/mL.   Outcome: pain improved    Patient tolerated the procedure well with no immediate complications.    Treatment Options discussed:  - no treatment at all  - change in shoegear  - change in activities  - RICE therapy  - arch support  - NSAIDs  - PO steroids  - injectable steroids    Patient may begin to weight bear as tolerated in supportive shoes.  No impact activities for two weeks.  After that time, the patient may increase activities as tolerated. Patient states understanding and agreement with this plan.    An After Visit Summary was printed and given to the patient at discharge, including (if requested) any available informative/educational handouts regarding diagnosis, treatment, or medications. All questions were answered to patient/family satisfaction. Should symptoms fail to improve or worsen they agree to call or return to clinic or to go to the Emergency Department. Discussed the importance of following up with any needed screening tests/labs/specialist appointments and any requested follow-up recommended by me today. Importance of maintaining follow-up discussed and patient accepts that missed appointments can delay diagnosis and potentially lead to worsening of conditions.    No follow-ups on file., or sooner if acute issues arise.    This document has been electronically signed by Ciro Em DPM on December 16, 2022 15:53 EST

## 2023-01-06 ENCOUNTER — TELEPHONE (OUTPATIENT)
Dept: FAMILY MEDICINE CLINIC | Facility: CLINIC | Age: 28
End: 2023-01-06
Payer: COMMERCIAL

## 2023-01-06 DIAGNOSIS — K64.9 HEMORRHOIDS, UNSPECIFIED HEMORRHOID TYPE: Primary | ICD-10-CM

## 2023-01-06 RX ORDER — HYDROCORTISONE ACETATE 25 MG/1
25 SUPPOSITORY RECTAL 2 TIMES DAILY
Qty: 15 EACH | Refills: 1 | Status: SHIPPED | OUTPATIENT
Start: 2023-01-06 | End: 2023-01-06 | Stop reason: CLARIF

## 2023-01-06 NOTE — TELEPHONE ENCOUNTER
Patient having some hemorrhoids and would like to have something sent in the prescription.  Over-the-counter has not been helping.  Let patient know may need to check if breastfeeding.

## 2023-01-06 NOTE — TELEPHONE ENCOUNTER
----- Message from Roselyn Muniz sent at 1/6/2023 12:22 PM EST -----  Regarding: Today’s prescription   Contact: [phone]  Hello, the [facility] in [facility] do not have this prescription in stock, and apparently my insurance does not want to cover it. Is there an alternative?  Thank you!

## 2023-02-09 ENCOUNTER — OFFICE VISIT (OUTPATIENT)
Dept: FAMILY MEDICINE CLINIC | Facility: CLINIC | Age: 28
End: 2023-02-09
Payer: COMMERCIAL

## 2023-02-09 VITALS
SYSTOLIC BLOOD PRESSURE: 122 MMHG | HEART RATE: 118 BPM | RESPIRATION RATE: 19 BRPM | DIASTOLIC BLOOD PRESSURE: 82 MMHG | OXYGEN SATURATION: 98 % | TEMPERATURE: 97.2 F

## 2023-02-09 DIAGNOSIS — J02.9 SORE THROAT: ICD-10-CM

## 2023-02-09 DIAGNOSIS — R09.89 CHEST CONGESTION: ICD-10-CM

## 2023-02-09 DIAGNOSIS — B34.9 VIRAL ILLNESS: Primary | ICD-10-CM

## 2023-02-09 DIAGNOSIS — R05.1 ACUTE COUGH: ICD-10-CM

## 2023-02-09 DIAGNOSIS — B37.9 INFECTION DUE TO YEAST: Primary | ICD-10-CM

## 2023-02-09 LAB
EXPIRATION DATE: NORMAL
FLUAV AG UPPER RESP QL IA.RAPID: NOT DETECTED
FLUBV AG UPPER RESP QL IA.RAPID: NOT DETECTED
INTERNAL CONTROL: NORMAL
Lab: NORMAL
SARS-COV-2 AG UPPER RESP QL IA.RAPID: NOT DETECTED

## 2023-02-09 PROCEDURE — 99213 OFFICE O/P EST LOW 20 MIN: CPT

## 2023-02-09 PROCEDURE — 87428 SARSCOV & INF VIR A&B AG IA: CPT

## 2023-02-09 RX ORDER — DEXTROMETHORPHAN HYDROBROMIDE AND PROMETHAZINE HYDROCHLORIDE 15; 6.25 MG/5ML; MG/5ML
5 SYRUP ORAL 4 TIMES DAILY PRN
Qty: 180 ML | Refills: 0 | Status: SHIPPED | OUTPATIENT
Start: 2023-02-09

## 2023-02-09 RX ORDER — AMOXICILLIN 500 MG/1
1000 CAPSULE ORAL 2 TIMES DAILY
Qty: 28 CAPSULE | Refills: 0 | Status: SHIPPED | OUTPATIENT
Start: 2023-02-09

## 2023-02-09 RX ORDER — METHYLPREDNISOLONE 4 MG/1
TABLET ORAL
Qty: 21 TABLET | Refills: 0 | Status: SHIPPED | OUTPATIENT
Start: 2023-02-09

## 2023-02-09 RX ORDER — FLUCONAZOLE 150 MG/1
150 TABLET ORAL ONCE
Qty: 1 TABLET | Refills: 0 | Status: SHIPPED | OUTPATIENT
Start: 2023-02-09 | End: 2023-02-09

## 2023-02-09 NOTE — PROGRESS NOTES
Roselyn Muniz presents to [facility] with complaints of body aches, fever, chills, nasal drainage, and headache.      History of Present Illness  This is a 27-year-old female who presents to clinic with complaints of body aches, fever, chills, nasal drainage, and headache.    Patient states that her symptoms started on Sunday, states that her and her daughter had both been sick around the same time, as her daughter has been in .  Patient states that her symptoms started with fatigue, body aches, some sinus pressure and drainage, and even developed a fever a night or 2 ago and just could not get warm.  Patient states that she is also had an associated headache, states that she has a cough but there is no phlegm production.  States that she feels like it sitting right in her throat she just cannot get coughed up.  Has tried some medication over-the-counter with no relief, and thus came in today for evaluation.  She denies any GI symptoms, denies any shortness of breath or chest pain, no wheezing or other associated symptoms.  States the cough is keeping her up at night.    The following portions of the patient's history were personally reviewed and updated as appropriate: allergies, current medications, past medical history, past surgical history, past family history, and past social history.       Objective   Vital Signs:   /82   Pulse 118   Temp 97.2 °F (36.2 °C)   Resp 19   SpO2 98%     There is no height or weight on file to calculate BMI.    All labs, imaging, test results, and specialty provider notes reviewed with patient.     Physical Exam  Vitals reviewed.   Constitutional:       Appearance: Normal appearance.   Cardiovascular:      Rate and Rhythm: Normal rate and regular rhythm.      Pulses: Normal pulses.      Heart sounds: Normal heart sounds.   Pulmonary:      Effort: Pulmonary effort is normal.      Breath sounds: Normal breath sounds.   Neurological:       General: No focal deficit present.      Mental Status: She is alert and oriented to person, place, and time.              Assessment and Plan:  Diagnoses and all orders for this visit:    1. Viral illness (Primary)  -     methylPREDNISolone (MEDROL) 4 MG dose pack; Take as directed on package instructions.  Dispense: 21 tablet; Refill: 0  -     POCT SARS-CoV-2 Antigen ALFREDA + Flu    2. Acute cough  -     amoxicillin (AMOXIL) 500 MG capsule; Take 2 capsules by mouth 2 (Two) Times a Day.  Dispense: 28 capsule; Refill: 0  -     methylPREDNISolone (MEDROL) 4 MG dose pack; Take as directed on package instructions.  Dispense: 21 tablet; Refill: 0  -     promethazine-dextromethorphan (PROMETHAZINE-DM) 6.25-15 MG/5ML syrup; Take 5 mL by mouth 4 (Four) Times a Day As Needed for Cough.  Dispense: 180 mL; Refill: 0    3. Chest congestion  -     amoxicillin (AMOXIL) 500 MG capsule; Take 2 capsules by mouth 2 (Two) Times a Day.  Dispense: 28 capsule; Refill: 0  -     methylPREDNISolone (MEDROL) 4 MG dose pack; Take as directed on package instructions.  Dispense: 21 tablet; Refill: 0    4. Sore throat  -     methylPREDNISolone (MEDROL) 4 MG dose pack; Take as directed on package instructions.  Dispense: 21 tablet; Refill: 0      Based off symptoms, does sound like her symptoms are viral in origin.  Did discuss with her this, did go ahead and test for COVID and flu both which were negative.  Did also discuss she could possibly have RSV, or another virus that is going around that we cannot test for in her office.  Discussed with her that I will go ahead and treat symptoms, send her in some cough medication that she can use at night, and also give her a Medrol pack to help with the inflammation and pain that she has been experiencing.  She can continue to take an decongestant over-the-counter to help further as well.  Did also discuss that if her symptoms fail to improve in the next 2 to 3 days, go ahead and send an antibiotic that  she can start at that time but do not start until then as I am afraid this is viral in origin would not treat.  Discussed with her that if not better after full course, further evaluation can be done at that time, but should have resolution of symptoms.    Follow Up:  No follow-ups on file.    Patient was given instructions and counseling regarding her condition or for health maintenance advice. Please see specific information pulled into the AVS if appropriate.

## 2023-02-09 NOTE — TELEPHONE ENCOUNTER
----- Message from Roselyn Muniz sent at 2/9/2023  3:15 PM EST -----  Regarding: Prescription   Contact: [phone]  Since I’m getting an antibiotic, is there a way to put in another prescription for Diflucan?

## 2023-04-20 ENCOUNTER — OFFICE VISIT (OUTPATIENT)
Dept: FAMILY MEDICINE CLINIC | Facility: CLINIC | Age: 28
End: 2023-04-20
Payer: COMMERCIAL

## 2023-04-20 VITALS
RESPIRATION RATE: 16 BRPM | HEIGHT: 63 IN | DIASTOLIC BLOOD PRESSURE: 70 MMHG | BODY MASS INDEX: 35.22 KG/M2 | OXYGEN SATURATION: 100 % | WEIGHT: 198.8 LBS | HEART RATE: 98 BPM | SYSTOLIC BLOOD PRESSURE: 110 MMHG | TEMPERATURE: 98 F

## 2023-04-20 DIAGNOSIS — D50.9 IRON DEFICIENCY ANEMIA, UNSPECIFIED IRON DEFICIENCY ANEMIA TYPE: ICD-10-CM

## 2023-04-20 DIAGNOSIS — Z00.00 ANNUAL PHYSICAL EXAM: ICD-10-CM

## 2023-04-20 DIAGNOSIS — G43.809 OTHER MIGRAINE WITHOUT STATUS MIGRAINOSUS, NOT INTRACTABLE: Primary | ICD-10-CM

## 2023-04-20 DIAGNOSIS — L65.9 HAIR LOSS: ICD-10-CM

## 2023-04-20 RX ORDER — RIMEGEPANT SULFATE 75 MG/75MG
75 TABLET, ORALLY DISINTEGRATING ORAL ONCE AS NEEDED
Qty: 24 TABLET | Refills: 1 | Status: SHIPPED | OUTPATIENT
Start: 2023-04-20

## 2023-04-20 RX ORDER — NORETHINDRONE ACETATE AND ETHINYL ESTRADIOL 1MG-20(21)
1 KIT ORAL DAILY
Qty: 28 TABLET | Refills: 3 | COMMUNITY
Start: 2023-03-06 | End: 2023-06-04

## 2023-04-20 NOTE — PROGRESS NOTES
Answers for HPI/ROS submitted by the patient on 4/13/2023  Please describe your symptoms.: Yearly checkup  Have you had these symptoms before?: No  How long have you been having these symptoms?: 1-4 days  What is the primary reason for your visit?: Other    Chief Complaint  Annual Exam, Headache (Would like an rx of Nurtec), and thinning hair (Is there anything besides vitamins she can take to help)    Subjective        Roselyn Muniz presents to [facility]  History of Present Illness  Annual exam:  Not able to walk like she likes due to long hours and child.    Headache:  triptans doesn't work.  Notes that nurtec works.  States that has 3-4 a month.    Does have thin hair.       The following portions of the patient's history were personally reviewed and updated as appropriate: allergies, current medications, past medical history, past surgical history, past family history, and past social history.     Body mass index is 35.22 kg/m².    Class 2 Severe Obesity (BMI >=35 and <=39.9). Obesity-related health conditions include the following: hypertension. Obesity is improving with lifestyle modifications. BMI is is above average; BMI management plan is completed. We discussed portion control and increasing exercise.      Past History:    Medical History: has a past medical history of Allergies, Anemia, Difficulty walking (2022), HBP (high blood pressure), High arches, Migraine, Plantar fasciitis (June 2022), Throat clearing, and Throat fullness.     Surgical History: has no past surgical history on file.     Family History: family history includes Cancer in her maternal grandmother; Cervical cancer (age of onset: 35) in an other family member; Diabetes in her father; Pancreatic cancer in an other family member.     Social History: reports that she has never smoked. She has never used smokeless tobacco. She reports that she does not drink alcohol and does not use drugs.    Allergies:  "Azithromycin and Triptans          Current Outpatient Medications:   •  norethindrone-ethinyl estradiol FE (JUNEL FE 1/20) 1-20 MG-MCG per tablet, Take 1 tablet by mouth Daily., Disp: 28 tablet, Rfl: 3  •  hydrocortisone 2.5 % cream, Apply 1 application topically to the appropriate area as directed 2 (Two) Times a Day., Disp: 20 g, Rfl: 1  •  ibuprofen (ADVIL,MOTRIN) 800 MG tablet, Take 1 tablet by mouth Every 6 (Six) Hours As Needed for Mild Pain., Disp: 90 tablet, Rfl: 0  •  Prenatal Vit-Fe Fumarate-FA (prenatal vitamin 27-0.8) 27-0.8 MG tablet tablet, Take  by mouth. (Patient not taking: Reported on 4/20/2023), Disp: , Rfl:   •  Rimegepant Sulfate (Nurtec) 75 MG tablet dispersible tablet, Take 1 tablet by mouth 1 (One) Time As Needed (migraine headache.) for up to 1 dose., Disp: 24 tablet, Rfl: 1    Medications Discontinued During This Encounter   Medication Reason   • amoxicillin (AMOXIL) 500 MG capsule *Therapy completed   • methylPREDNISolone (MEDROL) 4 MG dose pack *Therapy completed   • norethindrone (MICRONOR) 0.35 MG tablet *Therapy completed   • promethazine-dextromethorphan (PROMETHAZINE-DM) 6.25-15 MG/5ML syrup *Therapy completed         Review of Systems   Constitutional: Negative for fever.   Respiratory: Negative for cough and shortness of breath.    Cardiovascular: Negative for chest pain, palpitations and leg swelling.   Neurological: Negative for dizziness, weakness, numbness and headache.        Objective         Vitals:    04/20/23 0801   BP: 110/70   BP Location: Right arm   Patient Position: Sitting   Cuff Size: Adult   Pulse: 98   Resp: 16   Temp: 98 °F (36.7 °C)   TempSrc: Temporal   SpO2: 100%   Weight: 90.2 kg (198 lb 12.8 oz)   Height: 160 cm (62.99\")     Body mass index is 35.22 kg/m².         Physical Exam  Vitals reviewed.   Constitutional:       Appearance: Normal appearance. She is well-developed.   HENT:      Head: Normocephalic and atraumatic.      Mouth/Throat:      Pharynx: No " oropharyngeal exudate.   Eyes:      Conjunctiva/sclera: Conjunctivae normal.      Pupils: Pupils are equal, round, and reactive to light.   Cardiovascular:      Rate and Rhythm: Normal rate and regular rhythm.      Heart sounds: Normal heart sounds. No murmur heard.    No friction rub. No gallop.   Pulmonary:      Effort: Pulmonary effort is normal.      Breath sounds: Normal breath sounds. No wheezing or rhonchi.   Skin:     General: Skin is warm and dry.   Neurological:      Mental Status: She is alert and oriented to person, place, and time.   Psychiatric:         Mood and Affect: Mood and affect normal.         Behavior: Behavior normal.         Thought Content: Thought content normal.         Judgment: Judgment normal.             Result Review :               Assessment and Plan     Diagnoses and all orders for this visit:    1. Other migraine without status migrainosus, not intractable (Primary)  -     Rimegepant Sulfate (Nurtec) 75 MG tablet dispersible tablet; Take 1 tablet by mouth 1 (One) Time As Needed (migraine headache.) for up to 1 dose.  Dispense: 24 tablet; Refill: 1              Follow Up     No follow-ups on file.    Patient was given instructions and counseling regarding her condition or for health maintenance advice. Please see specific information pulled into the AVS if appropriate.

## 2023-11-13 ENCOUNTER — PATIENT MESSAGE (OUTPATIENT)
Dept: FAMILY MEDICINE CLINIC | Facility: CLINIC | Age: 28
End: 2023-11-13
Payer: COMMERCIAL

## 2023-11-13 DIAGNOSIS — L50.9 HIVES: Primary | ICD-10-CM

## 2023-11-15 NOTE — TELEPHONE ENCOUNTER
From: Roselyn Muniz  To: Tomasz Dasilva  Sent: 11/13/2023 4:27 PM EST  Subject: Hives question     Hello,    I have had hives on and off for the last month. I have never had them before. I had Covid also about a month ago, around the same time the hives appeared. I have been taking Allegra for Hives but they are still appearing. Would I need to do new blood work or an updated allergy test to see what’s causing them or just wait them out?    Thanks!

## 2024-02-12 ENCOUNTER — OFFICE VISIT (OUTPATIENT)
Dept: FAMILY MEDICINE CLINIC | Facility: CLINIC | Age: 29
End: 2024-02-12
Payer: COMMERCIAL

## 2024-02-12 VITALS
BODY MASS INDEX: 35.54 KG/M2 | DIASTOLIC BLOOD PRESSURE: 70 MMHG | HEIGHT: 63 IN | HEART RATE: 117 BPM | OXYGEN SATURATION: 100 % | SYSTOLIC BLOOD PRESSURE: 124 MMHG | TEMPERATURE: 97.4 F | WEIGHT: 200.6 LBS | RESPIRATION RATE: 16 BRPM

## 2024-02-12 DIAGNOSIS — J02.0 PHARYNGITIS DUE TO STREPTOCOCCUS SPECIES: Primary | ICD-10-CM

## 2024-02-12 LAB
EXPIRATION DATE: ABNORMAL
INTERNAL CONTROL: ABNORMAL
Lab: ABNORMAL
S PYO AG THROAT QL: POSITIVE

## 2024-02-12 PROCEDURE — 87880 STREP A ASSAY W/OPTIC: CPT | Performed by: NURSE PRACTITIONER

## 2024-02-12 PROCEDURE — 99213 OFFICE O/P EST LOW 20 MIN: CPT | Performed by: NURSE PRACTITIONER

## 2024-02-12 RX ORDER — FLUCONAZOLE 150 MG/1
150 TABLET ORAL ONCE
Qty: 2 TABLET | Refills: 0 | Status: SHIPPED | OUTPATIENT
Start: 2024-02-12 | End: 2024-02-12

## 2024-02-12 RX ORDER — AMOXICILLIN 500 MG/1
500 CAPSULE ORAL 3 TIMES DAILY
Qty: 30 CAPSULE | Refills: 0 | Status: SHIPPED | OUTPATIENT
Start: 2024-02-12

## 2024-03-06 ENCOUNTER — OFFICE VISIT (OUTPATIENT)
Dept: PODIATRY | Facility: CLINIC | Age: 29
End: 2024-03-06
Payer: COMMERCIAL

## 2024-03-06 VITALS
HEIGHT: 63 IN | TEMPERATURE: 97.7 F | HEART RATE: 86 BPM | SYSTOLIC BLOOD PRESSURE: 130 MMHG | DIASTOLIC BLOOD PRESSURE: 87 MMHG | WEIGHT: 200 LBS | BODY MASS INDEX: 35.44 KG/M2 | OXYGEN SATURATION: 98 %

## 2024-03-06 DIAGNOSIS — M72.2 PLANTAR FASCIITIS: Primary | ICD-10-CM

## 2024-03-06 DIAGNOSIS — M21.6X9 EQUINUS DEFORMITY OF FOOT: ICD-10-CM

## 2024-03-06 DIAGNOSIS — M79.671 RIGHT FOOT PAIN: ICD-10-CM

## 2024-03-06 RX ORDER — BUPIVACAINE HYDROCHLORIDE 5 MG/ML
5 INJECTION, SOLUTION PERINEURAL ONCE
Status: COMPLETED | OUTPATIENT
Start: 2024-03-06 | End: 2024-03-06

## 2024-03-06 RX ORDER — TRIAMCINOLONE ACETONIDE 40 MG/ML
20 INJECTION, SUSPENSION INTRA-ARTICULAR; INTRAMUSCULAR ONCE
Status: COMPLETED | OUTPATIENT
Start: 2024-03-06 | End: 2024-03-06

## 2024-03-06 RX ADMIN — TRIAMCINOLONE ACETONIDE 20 MG: 40 INJECTION, SUSPENSION INTRA-ARTICULAR; INTRAMUSCULAR at 08:31

## 2024-03-06 RX ADMIN — BUPIVACAINE HYDROCHLORIDE 5 ML: 5 INJECTION, SOLUTION PERINEURAL at 08:31

## 2024-03-06 NOTE — PROGRESS NOTES
[facility] - PODIATRY    Today's Date: 03/06/24    Patient Name: Roselyn Muniz  MRN: [id number]  CSN: [id number]  PCP: Tomasz Dasilva APRN,   Referring Provider: No ref. provider found    SUBJECTIVE     Chief Complaint   Patient presents with    Right Foot - Follow-up     States pain started to return 1 mo ago     HPI: Roselyn Muniz, a 28 y.o.female, comes to clinic.    Has been going on for couple months recently had a baby approximately 10 weeks ago.    Location: Right heel    Duration: When on feet  Onset:  gradual    Nature:  achy, sharp, shooting    Aggravating factors:  Patient describes morning bilateral heel pain as stabbing, burning, or aching. This pain usually subsides throughout the day, however it returns after periods of rest and sitting, when standing back up on their feet, and again the next morning.      Previous Treatment: Discussed proper shoegear for the patient's feet and medical condition.  Patient states understanding and agreement with this plan.    Patient denies any fevers, chills, nausea, vomiting, shortness of breath, nor any other constitutional signs nor symptoms.    No other pedal complaints at this time.    Past Medical History:   Diagnosis Date    Allergies     Anemia     During pregnancy    Difficulty walking 2022    HBP (high blood pressure)     High arches     Migraine     Plantar fasciitis June 2022    Throat clearing     Throat fullness      Past Surgical History:   Procedure Laterality Date    NO PAST SURGERIES       Family History   Problem Relation Age of Onset    Diabetes Father     Cancer Maternal Grandmother 75    Pancreatic cancer Other         Grandparents    Cervical cancer Other 35        Aunt     Social History     Socioeconomic History    Marital status:    Tobacco Use    Smoking status: Never     Passive exposure: Never    Smokeless tobacco: Never   Vaping Use    Vaping status: Never Used   Substance and Sexual Activity    Alcohol use: Never     Drug use: Never    Sexual activity: Yes     Partners: Male     Birth control/protection: Pill, Other     Allergies   Allergen Reactions    Azithromycin Other (See Comments)    Triptans Palpitations     Current Outpatient Medications   Medication Sig Dispense Refill    amoxicillin (AMOXIL) 500 MG capsule Take 1 capsule by mouth 3 (Three) Times a Day. 30 capsule 0    Biotin 2500 MCG chewable tablet Chew.      ibuprofen (ADVIL,MOTRIN) 800 MG tablet Take 1 tablet by mouth Every 6 (Six) Hours As Needed for Mild Pain. 90 tablet 0    norethindrone-ethinyl estradiol FE (JUNEL FE 1/20) 1-20 MG-MCG per tablet Take 1 tablet by mouth Daily. 28 tablet 3    Rimegepant Sulfate (Nurtec) 75 MG tablet dispersible tablet Take 1 tablet by mouth 1 (One) Time As Needed (migraine headache.) for up to 1 dose. 24 tablet 1     No current facility-administered medications for this visit.     Review of Systems   Musculoskeletal:         Right heel pain       OBJECTIVE     Vitals:    03/06/24 0803   BP: 130/87   Pulse: 86   Temp: 97.7 °F (36.5 °C)   SpO2: 98%       PHYSICAL EXAM     Foot/Ankle Exam    GENERAL  Orientation:  AAOx3  Affect:  appropriate  Gait:  unimpaired    VASCULAR     Right Foot Vascularity   Normal vascular exam    Dorsalis pedis:  2+  Posterior tibial:  2+  Skin temperature:  warm  Edema grading:  None  CFT:  < 3 seconds  Pedal hair growth:  Present  Varicosities:  none     Left Foot Vascularity   Normal vascular exam    Dorsalis pedis:  2+  Posterior tibial:  2+  Skin temperature:  warm  Edema grading:  None  CFT:  < 3 seconds  Pedal hair growth:  Present  Varicosities:  none     NEUROLOGIC     Right Foot Neurologic   Normal sensation    Light touch sensation: normal  Vibratory sensation: normal  Hot/Cold sensation: normal     Left Foot Neurologic   Normal sensation    Light touch sensation: normal  Vibratory sensation: normal  Hot/Cold sensation:  normal    MUSCULOSKELETAL     Right Foot Musculoskeletal   Tenderness:   "plantar fascia tenderness      MUSCLE STRENGTH     Right Foot Muscle Strength   Foot dorsiflexion:  4  Foot plantar flexion:  4  Foot inversion:  4  Foot eversion:  4     Left Foot Muscle Strength   Foot dorsiflexion:  4  Foot plantar flexion:  4  Foot inversion:  4  Foot eversion:  4    RANGE OF MOTION     Right Foot Range of Motion   Foot and ankle ROM within normal limits       Left Foot Range of Motion   Foot and ankle ROM within normal limits      DERMATOLOGIC      Right Foot Dermatologic   Skin  Right foot skin is intact.      Left Foot Dermatologic   Skin  Left foot skin is intact.         ASSESSMENT/PLAN     Diagnoses and all orders for this visit:    1. Right foot pain (Primary)    2. Plantar fasciitis    3. Equinus deformity of foot      Comprehensive lower extremity examination and evaluation was performed.    Discussed findings and treatment plan including risks, benefits, and treatment options with patient in detail. Patient agreed with treatment plan.    Plantar Fasciits Injection:    Date/Time: 7/21/2023  Performed by: Ciro Em DPM  Authorized by: Ciro Em DPM     Consent: Verbal consent obtained. Written consent obtained.  Risks and benefits: risks, benefits and alternatives were discussed  Consent given by: patient  Patient identity confirmed: verbally with patient  Indications: pain relief    Injection site: Right heel.    Sedation:  none    Patient position: sitting  Needle size: 27 G, 1 1/4\" in length  Injection medications:  0.5 ml 0.25% Marcaine plain, 0.5 ml Kenalog 40 mg/ml, and 0.5 ml Decadron 4 mg/mL.   Outcome: pain improved    Patient tolerated the procedure well with no immediate complications.    Treatment Options discussed:  - no treatment at all  - change in shoegear  - change in activities  - RICE therapy  - arch support  - NSAIDs  - PO steroids  - injectable steroids    Patient may begin to weight bear as tolerated in supportive shoes.  No impact activities for " two weeks.  After that time, the patient may increase activities as tolerated. Patient states understanding and agreement with this plan.    An After Visit Summary was printed and given to the patient at discharge, including (if requested) any available informative/educational handouts regarding diagnosis, treatment, or medications. All questions were answered to patient/family satisfaction. Should symptoms fail to improve or worsen they agree to call or return to clinic or to go to the Emergency Department. Discussed the importance of following up with any needed screening tests/labs/specialist appointments and any requested follow-up recommended by me today. Importance of maintaining follow-up discussed and patient accepts that missed appointments can delay diagnosis and potentially lead to worsening of conditions.    Return in about 1 month (around 4/6/2024)., or sooner if acute issues arise.    This document has been electronically signed by Ciro Em DPM on March 6, 2024 09:17 EST

## 2024-04-25 ENCOUNTER — LAB (OUTPATIENT)
Dept: LAB | Facility: HOSPITAL | Age: 29
End: 2024-04-25
Payer: COMMERCIAL

## 2024-04-25 ENCOUNTER — OFFICE VISIT (OUTPATIENT)
Dept: FAMILY MEDICINE CLINIC | Facility: CLINIC | Age: 29
End: 2024-04-25
Payer: COMMERCIAL

## 2024-04-25 VITALS
RESPIRATION RATE: 18 BRPM | DIASTOLIC BLOOD PRESSURE: 76 MMHG | HEART RATE: 97 BPM | BODY MASS INDEX: 35.86 KG/M2 | WEIGHT: 202.4 LBS | OXYGEN SATURATION: 99 % | TEMPERATURE: 96.5 F | HEIGHT: 63 IN | SYSTOLIC BLOOD PRESSURE: 122 MMHG

## 2024-04-25 DIAGNOSIS — Z00.00 ANNUAL PHYSICAL EXAM: ICD-10-CM

## 2024-04-25 DIAGNOSIS — L50.9 HIVES: ICD-10-CM

## 2024-04-25 DIAGNOSIS — Z30.41 ENCOUNTER FOR SURVEILLANCE OF CONTRACEPTIVE PILLS: ICD-10-CM

## 2024-04-25 DIAGNOSIS — Z12.31 ENCOUNTER FOR SCREENING MAMMOGRAM FOR MALIGNANT NEOPLASM OF BREAST: Primary | ICD-10-CM

## 2024-04-25 DIAGNOSIS — R51.9 CHRONIC NONINTRACTABLE HEADACHE, UNSPECIFIED HEADACHE TYPE: ICD-10-CM

## 2024-04-25 DIAGNOSIS — G43.809 OTHER MIGRAINE WITHOUT STATUS MIGRAINOSUS, NOT INTRACTABLE: ICD-10-CM

## 2024-04-25 DIAGNOSIS — G89.29 CHRONIC NONINTRACTABLE HEADACHE, UNSPECIFIED HEADACHE TYPE: ICD-10-CM

## 2024-04-25 LAB
ALBUMIN SERPL-MCNC: 4.3 G/DL (ref 3.5–5.2)
ALBUMIN/GLOB SERPL: 1.2 G/DL
ALP SERPL-CCNC: 93 U/L (ref 39–117)
ALT SERPL W P-5'-P-CCNC: 32 U/L (ref 1–33)
ANION GAP SERPL CALCULATED.3IONS-SCNC: 11 MMOL/L (ref 5–15)
AST SERPL-CCNC: 23 U/L (ref 1–32)
BACTERIA UR QL AUTO: ABNORMAL /HPF
BASOPHILS # BLD AUTO: 0.05 10*3/MM3 (ref 0–0.2)
BASOPHILS NFR BLD AUTO: 0.5 % (ref 0–1.5)
BILIRUB SERPL-MCNC: 0.2 MG/DL (ref 0–1.2)
BILIRUB UR QL STRIP: NEGATIVE
BUN SERPL-MCNC: 12 MG/DL (ref 6–20)
BUN/CREAT SERPL: 15.6 (ref 7–25)
CALCIUM SPEC-SCNC: 9.2 MG/DL (ref 8.6–10.5)
CHLORIDE SERPL-SCNC: 104 MMOL/L (ref 98–107)
CHOLEST SERPL-MCNC: 304 MG/DL (ref 0–200)
CLARITY UR: CLEAR
CO2 SERPL-SCNC: 21 MMOL/L (ref 22–29)
COLOR UR: ABNORMAL
CREAT SERPL-MCNC: 0.77 MG/DL (ref 0.57–1)
DEPRECATED RDW RBC AUTO: 41.5 FL (ref 37–54)
EGFRCR SERPLBLD CKD-EPI 2021: 107.2 ML/MIN/1.73
EOSINOPHIL # BLD AUTO: 0.07 10*3/MM3 (ref 0–0.4)
EOSINOPHIL NFR BLD AUTO: 0.7 % (ref 0.3–6.2)
ERYTHROCYTE [DISTWIDTH] IN BLOOD BY AUTOMATED COUNT: 13.1 % (ref 12.3–15.4)
GLOBULIN UR ELPH-MCNC: 3.5 GM/DL
GLUCOSE SERPL-MCNC: 86 MG/DL (ref 65–99)
GLUCOSE UR STRIP-MCNC: NEGATIVE MG/DL
HCT VFR BLD AUTO: 37.1 % (ref 34–46.6)
HDLC SERPL QL: 6.61
HDLC SERPL-MCNC: 46 MG/DL (ref 40–60)
HGB BLD-MCNC: 11.9 G/DL (ref 12–15.9)
HGB UR QL STRIP.AUTO: NEGATIVE
HOLD SPECIMEN: NORMAL
HYALINE CASTS UR QL AUTO: ABNORMAL /LPF
IMM GRANULOCYTES # BLD AUTO: 0.03 10*3/MM3 (ref 0–0.05)
IMM GRANULOCYTES NFR BLD AUTO: 0.3 % (ref 0–0.5)
KETONES UR QL STRIP: ABNORMAL
LDLC SERPL CALC-MCNC: 230 MG/DL (ref 0–100)
LEUKOCYTE ESTERASE UR QL STRIP.AUTO: ABNORMAL
LYMPHOCYTES # BLD AUTO: 2.51 10*3/MM3 (ref 0.7–3.1)
LYMPHOCYTES NFR BLD AUTO: 23.7 % (ref 19.6–45.3)
MCH RBC QN AUTO: 27.9 PG (ref 26.6–33)
MCHC RBC AUTO-ENTMCNC: 32.1 G/DL (ref 31.5–35.7)
MCV RBC AUTO: 87.1 FL (ref 79–97)
MONOCYTES # BLD AUTO: 0.62 10*3/MM3 (ref 0.1–0.9)
MONOCYTES NFR BLD AUTO: 5.9 % (ref 5–12)
NEUTROPHILS NFR BLD AUTO: 68.9 % (ref 42.7–76)
NEUTROPHILS NFR BLD AUTO: 7.3 10*3/MM3 (ref 1.7–7)
NITRITE UR QL STRIP: NEGATIVE
NRBC BLD AUTO-RTO: 0 /100 WBC (ref 0–0.2)
PH UR STRIP.AUTO: 6.5 [PH] (ref 5–8)
PLATELET # BLD AUTO: 339 10*3/MM3 (ref 140–450)
PMV BLD AUTO: 12.3 FL (ref 6–12)
POTASSIUM SERPL-SCNC: 3.8 MMOL/L (ref 3.5–5.2)
PROT SERPL-MCNC: 7.8 G/DL (ref 6–8.5)
PROT UR QL STRIP: ABNORMAL
RBC # BLD AUTO: 4.26 10*6/MM3 (ref 3.77–5.28)
RBC # UR STRIP: ABNORMAL /HPF
REF LAB TEST METHOD: ABNORMAL
SODIUM SERPL-SCNC: 136 MMOL/L (ref 136–145)
SP GR UR STRIP: 1.03 (ref 1–1.03)
SQUAMOUS #/AREA URNS HPF: ABNORMAL /HPF
TRIGL SERPL-MCNC: 150 MG/DL (ref 0–150)
TSH SERPL DL<=0.05 MIU/L-ACNC: 2.59 UIU/ML (ref 0.27–4.2)
UROBILINOGEN UR QL STRIP: ABNORMAL
VLDLC SERPL-MCNC: 28 MG/DL (ref 5–40)
WBC # UR STRIP: ABNORMAL /HPF
WBC NRBC COR # BLD AUTO: 10.58 10*3/MM3 (ref 3.4–10.8)

## 2024-04-25 PROCEDURE — 86003 ALLG SPEC IGE CRUDE XTRC EA: CPT

## 2024-04-25 PROCEDURE — 36415 COLL VENOUS BLD VENIPUNCTURE: CPT

## 2024-04-25 PROCEDURE — 99395 PREV VISIT EST AGE 18-39: CPT | Performed by: NURSE PRACTITIONER

## 2024-04-25 PROCEDURE — 80061 LIPID PANEL: CPT

## 2024-04-25 PROCEDURE — 82785 ASSAY OF IGE: CPT

## 2024-04-25 PROCEDURE — 87624 HPV HI-RISK TYP POOLED RSLT: CPT | Performed by: NURSE PRACTITIONER

## 2024-04-25 PROCEDURE — G0123 SCREEN CERV/VAG THIN LAYER: HCPCS | Performed by: NURSE PRACTITIONER

## 2024-04-25 PROCEDURE — 80050 GENERAL HEALTH PANEL: CPT

## 2024-04-25 PROCEDURE — 81001 URINALYSIS AUTO W/SCOPE: CPT

## 2024-04-25 RX ORDER — IBUPROFEN 800 MG/1
800 TABLET ORAL EVERY 6 HOURS PRN
Qty: 90 TABLET | Refills: 1 | Status: SHIPPED | OUTPATIENT
Start: 2024-04-25

## 2024-04-25 RX ORDER — NORETHINDRONE ACETATE AND ETHINYL ESTRADIOL 1MG-20(21)
1 KIT ORAL DAILY
COMMUNITY
End: 2024-04-25 | Stop reason: SDUPTHER

## 2024-04-25 RX ORDER — NORETHINDRONE ACETATE AND ETHINYL ESTRADIOL 1MG-20(21)
1 KIT ORAL DAILY
Qty: 84 TABLET | Refills: 4 | Status: SHIPPED | OUTPATIENT
Start: 2024-04-25

## 2024-04-25 RX ORDER — RIMEGEPANT SULFATE 75 MG/75MG
75 TABLET, ORALLY DISINTEGRATING ORAL ONCE AS NEEDED
Qty: 8 TABLET | Refills: 5 | Status: SHIPPED | OUTPATIENT
Start: 2024-04-25

## 2024-04-25 NOTE — PROGRESS NOTES
Answers submitted by the patient for this visit:  Other (Submitted on 4/18/2024)  Please describe your symptoms.: Yearly with pap  Have you had these symptoms before?: Yes  How long have you been having these symptoms?: Greater than 2 weeks  Primary Reason for Visit (Submitted on 4/18/2024)  What is the primary reason for your visit?: Other  Chief Complaint  Annual Exam, Gynecologic Exam, and Rash (Bumps on hands)    Subjective        Roselyn Muniz presents to [facility]  History of Present Illness  Annual exam with gynecological exam.  SBE  doesn't do and not had any changes to your period.  Denies ACHES or BTB.    Rash:  had bumps on back of hand.  While having covid but is gone now.  Gynecologic Exam  Associated symptoms include rash. Pertinent negatives include no fever.   Rash  Pertinent negatives include no cough, fever or shortness of breath.       The following portions of the patient's history were personally reviewed and updated as appropriate: allergies, current medications, past medical history, past surgical history, past family history, and past social history.     Body mass index is 35.86 kg/m².    Class 2 Severe Obesity (BMI >=35 and <=39.9). Obesity-related health conditions include the following: none. Obesity is unchanged. BMI is is above average; BMI management plan is completed. We discussed portion control and increasing exercise.      Past History:    Medical History: has a past medical history of Allergies, Anemia, Difficulty walking (2022), HBP (high blood pressure), High arches, Migraine, Plantar fasciitis (June 2022), Throat clearing, and Throat fullness.     Surgical History: has a past surgical history that includes No past surgeries.     Family History: family history includes Cancer (age of onset: 75) in her maternal grandmother; Cervical cancer (age of onset: 35) in an other family member; Diabetes in her father; Pancreatic cancer in an other family  "member.     Social History: reports that she has never smoked. She has never been exposed to tobacco smoke. She has never used smokeless tobacco. She reports that she does not drink alcohol and does not use drugs.    Allergies: Azithromycin and Triptans          Current Outpatient Medications:     Biotin 2500 MCG chewable tablet, Chew., Disp: , Rfl:     ibuprofen (ADVIL,MOTRIN) 800 MG tablet, Take 1 tablet by mouth Every 6 (Six) Hours As Needed for Mild Pain., Disp: 90 tablet, Rfl: 1    norethindrone-ethinyl estradiol FE (Blisovi FE 1/20) 1-20 MG-MCG per tablet, Take 1 tablet by mouth Daily., Disp: 84 tablet, Rfl: 4    Rimegepant Sulfate (Nurtec) 75 MG tablet dispersible tablet, Take 1 tablet by mouth 1 (One) Time As Needed (migraine headache.) for up to 48 doses., Disp: 8 tablet, Rfl: 5    Medications Discontinued During This Encounter   Medication Reason    amoxicillin (AMOXIL) 500 MG capsule *Therapy completed    norethindrone-ethinyl estradiol FE (JUNEL FE 1/20) 1-20 MG-MCG per tablet *Therapy completed    ibuprofen (ADVIL,MOTRIN) 800 MG tablet Reorder    Rimegepant Sulfate (Nurtec) 75 MG tablet dispersible tablet Reorder    norethindrone-ethinyl estradiol FE (Blisovi FE 1/20) 1-20 MG-MCG per tablet Reorder         Review of Systems   Constitutional:  Negative for fever.   Respiratory:  Negative for cough and shortness of breath.    Cardiovascular:  Negative for chest pain, palpitations and leg swelling.   Skin:  Positive for rash.   Neurological:  Negative for dizziness, weakness, numbness and headache.        Objective         Vitals:    04/25/24 0717   BP: 122/76   BP Location: Right arm   Patient Position: Sitting   Cuff Size: Large Adult   Pulse: 97   Resp: 18   Temp: 96.5 °F (35.8 °C)   TempSrc: Temporal   SpO2: 99%   Weight: 91.8 kg (202 lb 6.4 oz)   Height: 160 cm (62.99\")     Body mass index is 35.86 kg/m².         Physical Exam  Vitals reviewed.   Constitutional:       Appearance: Normal appearance. " She is well-developed.   HENT:      Head: Normocephalic and atraumatic.      Mouth/Throat:      Pharynx: No oropharyngeal exudate.   Eyes:      Conjunctiva/sclera: Conjunctivae normal.      Pupils: Pupils are equal, round, and reactive to light.   Neck:      Thyroid: No thyroid mass, thyromegaly or thyroid tenderness.   Cardiovascular:      Rate and Rhythm: Normal rate and regular rhythm.      Heart sounds: Normal heart sounds. No murmur heard.     No friction rub. No gallop.   Pulmonary:      Effort: Pulmonary effort is normal.      Breath sounds: Normal breath sounds. No wheezing or rhonchi.   Abdominal:      General: Bowel sounds are normal. There is no distension.      Palpations: Abdomen is soft.      Tenderness: There is no abdominal tenderness. There is no right CVA tenderness, left CVA tenderness or guarding.      Hernia: There is no hernia in the left inguinal area or right inguinal area.   Genitourinary:     Pubic Area: No rash.       Labia:         Right: No rash or injury.         Left: No rash or injury.       Urethra: No prolapse.      Vagina: Normal.      Cervix: Normal.      Uterus: Normal.       Adnexa: Right adnexa normal and left adnexa normal.      Rectum: Normal.   Musculoskeletal:         General: Normal range of motion.      Cervical back: Normal range of motion.   Skin:     General: Skin is warm and dry.      Capillary Refill: Capillary refill takes less than 2 seconds.   Neurological:      Mental Status: She is alert and oriented to person, place, and time.      Cranial Nerves: No cranial nerve deficit.   Psychiatric:         Mood and Affect: Mood and affect normal.         Behavior: Behavior normal.         Thought Content: Thought content normal.         Judgment: Judgment normal.             Result Review :               Assessment and Plan     Diagnoses and all orders for this visit:    1. Encounter for screening mammogram for malignant neoplasm of breast (Primary)  -     IgP, Aptima  HPV; Future  -     IgP, Aptima HPV    2. Other migraine without status migrainosus, not intractable  -     Rimegepant Sulfate (Nurtec) 75 MG tablet dispersible tablet; Take 1 tablet by mouth 1 (One) Time As Needed (migraine headache.) for up to 48 doses.  Dispense: 8 tablet; Refill: 5    3. Chronic nonintractable headache, unspecified headache type  -     ibuprofen (ADVIL,MOTRIN) 800 MG tablet; Take 1 tablet by mouth Every 6 (Six) Hours As Needed for Mild Pain.  Dispense: 90 tablet; Refill: 1    4. Annual physical exam  -     Comprehensive Metabolic Panel; Future  -     Lipid Panel With / Chol / HDL Ratio; Future  -     Urinalysis With Culture If Indicated -; Future  -     CBC & Differential; Future  -     TSH Rfx On Abnormal To Free T4; Future    5. Encounter for surveillance of contraceptive pills  -     norethindrone-ethinyl estradiol FE (Blisovi FE 1/20) 1-20 MG-MCG per tablet; Take 1 tablet by mouth Daily.  Dispense: 84 tablet; Refill: 4              Follow Up     No follow-ups on file.    Patient was given instructions and counseling regarding her condition or for health maintenance advice. Please see specific information pulled into the AVS if appropriate.

## 2024-04-26 DIAGNOSIS — E78.2 MIXED HYPERLIPIDEMIA: Primary | ICD-10-CM

## 2024-04-26 RX ORDER — ATORVASTATIN CALCIUM 10 MG/1
10 TABLET, FILM COATED ORAL DAILY
Qty: 90 TABLET | Refills: 1 | Status: SHIPPED | OUTPATIENT
Start: 2024-04-26 | End: 2024-04-29 | Stop reason: SDUPTHER

## 2024-04-29 DIAGNOSIS — E78.2 MIXED HYPERLIPIDEMIA: ICD-10-CM

## 2024-04-29 LAB
CYTOLOGIST CVX/VAG CYTO: NORMAL
CYTOLOGY CVX/VAG DOC CYTO: NORMAL
CYTOLOGY CVX/VAG DOC THIN PREP: NORMAL
DX ICD CODE: NORMAL
HPV I/H RISK 4 DNA CVX QL PROBE+SIG AMP: NEGATIVE
Lab: NORMAL
OTHER STN SPEC: NORMAL
STAT OF ADQ CVX/VAG CYTO-IMP: NORMAL

## 2024-04-29 RX ORDER — ATORVASTATIN CALCIUM 10 MG/1
10 TABLET, FILM COATED ORAL DAILY
Qty: 90 TABLET | Refills: 1 | Status: SHIPPED | OUTPATIENT
Start: 2024-04-29

## 2024-05-01 LAB
A ALTERNATA IGE QN: <0.1 KU/L
A FUMIGATUS IGE QN: <0.1 KU/L
AMER ROACH IGE QN: <0.1 KU/L
BAHIA GRASS IGE QN: <0.1 KU/L
BAYBERRY POLN IGE QN: <0.1 KU/L
BERMUDA GRASS IGE QN: <0.1 KU/L
BOXELDER IGE QN: <0.1 KU/L
C HERBARUM IGE QN: <0.1 KU/L
CAT DANDER IGE QN: <0.1 KU/L
COMMON RAGWEED IGE QN: <0.1 KU/L
CONV CLASS DESCRIPTION: NORMAL
D FARINAE IGE QN: <0.1 KU/L
D PTERONYSS IGE QN: <0.1 KU/L
DOG DANDER IGE QN: <0.1 KU/L
DOG FENNEL IGE QN: <0.1 KU/L
ENGL PLANTAIN IGE QN: <0.1 KU/L
GOOSEFOOT IGE QN: <0.1 KU/L
GUM-TREE IGE QN: <0.1 KU/L
ITALIAN CYPRESS IGE QN: <0.1 KU/L
JOHNSON GRASS IGE QN: <0.1 KU/L
M RACEMOSUS IGE QN: <0.1 KU/L
P NOTATUM IGE QN: <0.1 KU/L
PEPPER TREE IGE QN: <0.1 KU/L
PER RYE GRASS IGE QN: <0.1 KU/L
PRIVET IGE QN: <0.1 KU/L
QUEEN PALM IGE QN: <0.1 KU/L
S BOTRYOSUM IGE QN: <0.1 KU/L
SHEEP SORREL IGE QN: <0.1 KU/L
VIRG LIVE OAK IGE QN: <0.1 KU/L
WHITE ELM IGE QN: <0.1 KU/L

## 2024-05-02 LAB
CLAM IGE QN: <0.1 KU/L
CODFISH IGE QN: <0.1 KU/L
CONV CLASS DESCRIPTION: NORMAL
CORN IGE QN: <0.1 KU/L
COW MILK IGE QN: <0.1 KU/L
EGG WHITE IGE QN: <0.1 KU/L
IGE SERPL-ACNC: 9 IU/ML (ref 6–495)
PEANUT IGE QN: <0.1 KU/L
SCALLOP IGE QN: <0.1 KU/L
SHRIMP IGE QN: <0.1 KU/L
SOYBEAN IGE QN: <0.1 KU/L
WALNUT IGE QN: <0.1 KU/L
WHEAT IGE QN: <0.1 KU/L

## 2024-07-05 ENCOUNTER — OFFICE VISIT (OUTPATIENT)
Dept: PODIATRY | Facility: CLINIC | Age: 29
End: 2024-07-05
Payer: COMMERCIAL

## 2024-07-05 VITALS
BODY MASS INDEX: 35.26 KG/M2 | TEMPERATURE: 97.8 F | SYSTOLIC BLOOD PRESSURE: 122 MMHG | DIASTOLIC BLOOD PRESSURE: 80 MMHG | HEIGHT: 63 IN | WEIGHT: 199 LBS | HEART RATE: 83 BPM | OXYGEN SATURATION: 96 %

## 2024-07-05 DIAGNOSIS — D36.10 NEUROMA: ICD-10-CM

## 2024-07-05 DIAGNOSIS — M77.41 METATARSALGIA OF RIGHT FOOT: Primary | ICD-10-CM

## 2024-07-05 NOTE — PROGRESS NOTES
[facility] - PODIATRY    Today's Date: 07/05/24    Patient Name: Roselyn Muniz  MRN: [id number]  CSN: [id number]  PCP: Tomasz Dasilva APRN,   Referring Provider: No ref. provider found    SUBJECTIVE     Chief Complaint   Patient presents with    Right Foot - Follow-up, Pain     Patient c/o pain ball of foot since 6/20/24     no known injury  occasional pain in achilles area     HPI: Roselyn Muniz, a 29 y.o.female, comes to clinic.    Patient says that a few days ago she started having pain between her third and fourth toe.  She has been walking more than usual.  She says since then it has actually gone away.  She is going on a trip in a week where she will be doing a lot of walking.    Past Medical History:   Diagnosis Date    Allergies     Anemia     During pregnancy    Difficulty walking 2022    HBP (high blood pressure)     High arches     Migraine     Plantar fasciitis June 2022    Throat clearing     Throat fullness      Past Surgical History:   Procedure Laterality Date    NO PAST SURGERIES       Family History   Problem Relation Age of Onset    Diabetes Father     Cancer Maternal Grandmother 75    Pancreatic cancer Other         Grandparents    Cervical cancer Other 35        Aunt     Social History     Socioeconomic History    Marital status:    Tobacco Use    Smoking status: Never     Passive exposure: Never    Smokeless tobacco: Never   Vaping Use    Vaping status: Never Used   Substance and Sexual Activity    Alcohol use: Never    Drug use: Not Currently    Sexual activity: Yes     Partners: Male     Birth control/protection: Other, Pill     Allergies   Allergen Reactions    Azithromycin Other (See Comments)    Triptans Palpitations     Current Outpatient Medications   Medication Sig Dispense Refill    atorvastatin (LIPITOR) 10 MG tablet Take 1 tablet by mouth Daily. 90 tablet 1    Biotin 2500 MCG chewable tablet Chew.      ibuprofen (ADVIL,MOTRIN) 800 MG tablet Take 1 tablet by  mouth Every 6 (Six) Hours As Needed for Mild Pain. 90 tablet 1    norethindrone-ethinyl estradiol FE (Blisovi FE 1/20) 1-20 MG-MCG per tablet Take 1 tablet by mouth Daily. 84 tablet 4    Rimegepant Sulfate (Nurtec) 75 MG tablet dispersible tablet Take 1 tablet by mouth 1 (One) Time As Needed (migraine headache.) for up to 48 doses. 8 tablet 5     No current facility-administered medications for this visit.     Review of Systems   Constitutional: Negative.    HENT: Negative.     Eyes: Negative.    Respiratory: Negative.     Cardiovascular: Negative.    Gastrointestinal: Negative.    Endocrine: Negative.    Genitourinary: Negative.    Musculoskeletal:  Positive for arthralgias.   Skin: Negative.    Allergic/Immunologic: Negative.    Neurological: Negative.    Hematological: Negative.    Psychiatric/Behavioral: Negative.     All other systems reviewed and are negative.      OBJECTIVE     Vitals:    07/05/24 0955   BP: 122/80   Pulse: 83   Temp: 97.8 °F (36.6 °C)   SpO2: 96%       PHYSICAL EXAM     Foot/Ankle Exam    GENERAL  Appearance:  appears stated age  Orientation:  AAOx3  Affect:  appropriate  Gait:  unimpaired  Assistance:  independent  Right shoe gear: casual shoe  Left shoe gear: casual shoe    VASCULAR     Right Foot Vascularity   Normal vascular exam    Dorsalis pedis:  2+  Posterior tibial:  2+  Skin temperature:  warm  Edema grading:  None  CFT:  < 3 seconds  Pedal hair growth:  Present  Varicosities:  none     Left Foot Vascularity   Normal vascular exam    Dorsalis pedis:  2+  Posterior tibial:  2+  Skin temperature:  warm  Edema grading:  None  CFT:  < 3 seconds  Pedal hair growth:  Present  Varicosities:  none     NEUROLOGIC     Right Foot Neurologic   Normal sensation    Light touch sensation: normal  Vibratory sensation: normal  Hot/Cold sensation: normal  Protective Sensation using [place] Monofilament:   Sites intact: 10  Sites tested: 10     Left Foot Neurologic   Normal sensation     Light touch sensation: normal  Vibratory sensation: normal  Hot/Cold sensation:  normal  Protective Sensation using [place] Monofilament:   Sites intact: 10  Sites tested: 10    MUSCLE STRENGTH     Right Foot Muscle Strength   Foot dorsiflexion:  4  Foot plantar flexion:  4  Foot inversion:  4  Foot eversion:  4     Left Foot Muscle Strength   Foot dorsiflexion:  4  Foot plantar flexion:  4  Foot inversion:  4  Foot eversion:  4    RANGE OF MOTION     Right Foot Range of Motion   Foot and ankle ROM within normal limits       Left Foot Range of Motion   Foot and ankle ROM within normal limits      DERMATOLOGIC      Right Foot Dermatologic   Skin  Right foot skin is intact.      Left Foot Dermatologic   Skin  Left foot skin is intact.         ASSESSMENT/PLAN     Diagnoses and all orders for this visit:    1. Metatarsalgia of right foot (Primary)    2. Neuroma        Comprehensive lower extremity examination and evaluation was performed.    Discussed treatment for neuromas including injections.  Patient is not having any pain today so we will hold off on injections.  Discussed getting an injection if it gets aggravated when she goes hiking next week.    Return to clinic in 3 weeks or as needed, avoid high-heeled shoes and continue stretching to the ankle.    Discussed findings and treatment plan including risks, benefits, and treatment options with patient in detail. Patient agreed with treatment plan.      An After Visit Summary was printed and given to the patient at discharge, including (if requested) any available informative/educational handouts regarding diagnosis, treatment, or medications. All questions were answered to patient/family satisfaction. Should symptoms fail to improve or worsen they agree to call or return to clinic or to go to the Emergency Department. Discussed the importance of following up with any needed screening tests/labs/specialist appointments and any requested follow-up  recommended by me today. Importance of maintaining follow-up discussed and patient accepts that missed appointments can delay diagnosis and potentially lead to worsening of conditions.    Return in about 1 month (around 8/5/2024), or if symptoms worsen or fail to improve., or sooner if acute issues arise.    This document has been electronically signed by Ciro Em DPM on July 5, 2024 12:35 EDT

## 2024-07-22 ENCOUNTER — PATIENT MESSAGE (OUTPATIENT)
Dept: FAMILY MEDICINE CLINIC | Facility: CLINIC | Age: 29
End: 2024-07-22
Payer: COMMERCIAL

## 2024-07-22 DIAGNOSIS — E78.2 MIXED HYPERLIPIDEMIA: Primary | ICD-10-CM

## 2024-07-24 RX ORDER — SIMVASTATIN 10 MG
10 TABLET ORAL NIGHTLY
Qty: 90 TABLET | Refills: 1 | Status: SHIPPED | OUTPATIENT
Start: 2024-07-24

## 2024-07-24 NOTE — TELEPHONE ENCOUNTER
From: Roselyn Muniz  To: Tomasz Dasilva  Sent: 7/22/2024 7:45 PM EDT  Subject: Lipitor side effects    Hello, I was checking to see if there were any alternatives to the cholesterol medication I’m taking currently. I have been struggling with leg pain below the knee, and pain at the bends in my arms daily. I have tried days of not taking the meds and the pain usually goes away. The leg pain is making it hard to stay on my feet when needed and adding to the pain with my plantar fasciitis. Would you recommend blood work soon or what would be better? I’m trying to deal with the pain but it’s taking a toll on my daily quality of life.     Thank you

## 2024-08-07 DIAGNOSIS — G43.809 OTHER MIGRAINE WITHOUT STATUS MIGRAINOSUS, NOT INTRACTABLE: ICD-10-CM

## 2024-08-08 RX ORDER — RIMEGEPANT SULFATE 75 MG/75MG
75 TABLET, ORALLY DISINTEGRATING ORAL ONCE AS NEEDED
Qty: 8 TABLET | Refills: 5 | Status: SHIPPED | OUTPATIENT
Start: 2024-08-08

## 2024-08-15 DIAGNOSIS — G43.809 OTHER MIGRAINE WITHOUT STATUS MIGRAINOSUS, NOT INTRACTABLE: Primary | ICD-10-CM

## 2024-08-29 ENCOUNTER — PATIENT MESSAGE (OUTPATIENT)
Dept: FAMILY MEDICINE CLINIC | Facility: CLINIC | Age: 29
End: 2024-08-29
Payer: COMMERCIAL

## 2024-08-29 DIAGNOSIS — G43.809 OTHER MIGRAINE WITHOUT STATUS MIGRAINOSUS, NOT INTRACTABLE: Primary | ICD-10-CM

## 2024-08-29 NOTE — TELEPHONE ENCOUNTER
From: Leonel CHAN  To: Roselyn Muniz  Sent: 8/29/2024 10:58 AM EDT  Subject: [facility]    Please call them and find out why they haven't made a determination yet on the Nurtec.    [phone]

## 2024-08-30 DIAGNOSIS — G43.809 OTHER MIGRAINE WITHOUT STATUS MIGRAINOSUS, NOT INTRACTABLE: ICD-10-CM

## 2024-09-05 ENCOUNTER — PATIENT MESSAGE (OUTPATIENT)
Dept: FAMILY MEDICINE CLINIC | Facility: CLINIC | Age: 29
End: 2024-09-05
Payer: COMMERCIAL

## 2024-09-05 DIAGNOSIS — G43.809 OTHER MIGRAINE WITHOUT STATUS MIGRAINOSUS, NOT INTRACTABLE: Primary | ICD-10-CM

## 2024-09-05 NOTE — TELEPHONE ENCOUNTER
Okay! And samples would be great.        AUDREY Buchanan30 minutes ago (1:10 PM)       I will note that.  But they may not approve the Nurtec until next month.  So if you want to stop by and get samples of Nurtec we can certainly give those to you.  If you want to try it again 1 more time to see if it gives the same symptoms meaning Ubrelvy then you can do that as well.       Tomasz Dasilva, APRN30 minutes ago (1:09 PM)       From: Roselyn Muniz  To: Tomasz Dasilva  Sent: 9/5/2024 12:51 PM EDT  Subject: Ubrelvy    Hey I picked up the new migraine prescription yesterday. Ended up having to take it yesterday due to migraine as a result of stress. It took a while to finally kick in I guess but made me feel odd to where I had to stop what I was doing and just sit there. It may be in my head but I didn’t like that feeling. Luckily I was home and not at work or driving.         Note         You routed conversation to Tomasz Dasilva APRN45 minutes ago (12:55 PM)     Roselyn GRIFFITHS [facility] Clinical Pool (supporting AUDREY Buchanan)49 minutes ago (12:51 PM)       Hey I picked up the new migraine prescription yesterday. Ended up having to take it yesterday due to migraine as a result of stress. It took a while to finally kick in I guess but made me feel odd to where I had to stop what I was doing and just sit there. It may be in my head but I didn’t like that feeling. Luckily I was home and not at work or driving.

## 2024-09-05 NOTE — TELEPHONE ENCOUNTER
From: Roselyn Muniz  To: Tomasz Dasilva  Sent: 9/5/2024 12:51 PM EDT  Subject: Ubrelvy    Hey I picked up the new migraine prescription yesterday. Ended up having to take it yesterday due to migraine as a result of stress. It took a while to finally kick in I guess but made me feel odd to where I had to stop what I was doing and just sit there. It may be in my head but I didn’t like that feeling. Luckily I was home and not at work or driving.

## 2024-09-26 ENCOUNTER — PATIENT MESSAGE (OUTPATIENT)
Dept: FAMILY MEDICINE CLINIC | Facility: CLINIC | Age: 29
End: 2024-09-26
Payer: COMMERCIAL

## 2024-09-26 ENCOUNTER — OFFICE VISIT (OUTPATIENT)
Dept: PODIATRY | Facility: CLINIC | Age: 29
End: 2024-09-26
Payer: COMMERCIAL

## 2024-09-26 VITALS
DIASTOLIC BLOOD PRESSURE: 83 MMHG | OXYGEN SATURATION: 99 % | WEIGHT: 205 LBS | BODY MASS INDEX: 36.32 KG/M2 | HEART RATE: 82 BPM | HEIGHT: 63 IN | SYSTOLIC BLOOD PRESSURE: 129 MMHG | TEMPERATURE: 97.8 F

## 2024-09-26 DIAGNOSIS — M72.2 PLANTAR FASCIITIS: Primary | ICD-10-CM

## 2024-09-26 DIAGNOSIS — G43.809 OTHER MIGRAINE WITHOUT STATUS MIGRAINOSUS, NOT INTRACTABLE: ICD-10-CM

## 2024-09-26 DIAGNOSIS — M21.6X9 EQUINUS DEFORMITY OF FOOT: ICD-10-CM

## 2024-09-26 RX ORDER — BUPIVACAINE HYDROCHLORIDE 5 MG/ML
5 INJECTION, SOLUTION PERINEURAL ONCE
Status: COMPLETED | OUTPATIENT
Start: 2024-09-26 | End: 2024-09-26

## 2024-09-26 RX ORDER — TRIAMCINOLONE ACETONIDE 40 MG/ML
20 INJECTION, SUSPENSION INTRA-ARTICULAR; INTRAMUSCULAR ONCE
Status: COMPLETED | OUTPATIENT
Start: 2024-09-26 | End: 2024-09-26

## 2024-09-26 RX ADMIN — TRIAMCINOLONE ACETONIDE 20 MG: 40 INJECTION, SUSPENSION INTRA-ARTICULAR; INTRAMUSCULAR at 09:39

## 2024-09-26 RX ADMIN — BUPIVACAINE HYDROCHLORIDE 5 ML: 5 INJECTION, SOLUTION PERINEURAL at 09:39

## 2024-10-02 ENCOUNTER — TELEPHONE (OUTPATIENT)
Dept: FAMILY MEDICINE CLINIC | Facility: CLINIC | Age: 29
End: 2024-10-02
Payer: COMMERCIAL

## 2024-10-02 DIAGNOSIS — G43.809 OTHER MIGRAINE WITHOUT STATUS MIGRAINOSUS, NOT INTRACTABLE: Primary | ICD-10-CM

## 2024-10-02 NOTE — TELEPHONE ENCOUNTER
I HAVENT RECEIVED A DENIAL LETTER  - PA WAS JUST NOW SUBMITTED SO I WILL WAIT UNTIL INSURANCE RESPOND TO MY REQUEST BEFORE MOVING ON TO APPEAL

## 2024-10-02 NOTE — TELEPHONE ENCOUNTER
Caller: MORENO WITH [facility]    Relationship to patient: OTHER    Best call back number: [phone]    Patient is needing: CALLER STATED THAT SHE IS NEEDING MORE INFORMATION FOR THE APPEAL ON MEDICATION NURTEC THAT WAS WRITTEN BY MACI WEBSTER.   CALL WAS DISCONNECTED PRIOR TO GETTING MORE INFORMATION AND WAS NOT ABLE TO GET BACK ON THE LINE.

## 2024-10-09 ENCOUNTER — TELEPHONE (OUTPATIENT)
Dept: FAMILY MEDICINE CLINIC | Facility: CLINIC | Age: 29
End: 2024-10-09
Payer: COMMERCIAL

## 2024-10-09 NOTE — TELEPHONE ENCOUNTER
Caller: DR JUARES    Relationship: Other    Best call back number: .     What is the best time to reach you: ANYTIME    Who are you requesting to speak with (clinical staff, provider,  specific staff member): MACI WEBSTER          What was the call regarding:       DR JUARES IS REQUESTING A PEER TO PEER WITH RUTHANN WEBSTER REGARDING THIS PATIENT . HE IS REQUESTING A CALL BACK

## 2024-10-09 NOTE — TELEPHONE ENCOUNTER
Attempted a call and was sent to voicemail.  For peer to peer.  We may have to try something else or have more specific explanation of side effects.

## 2024-10-10 NOTE — TELEPHONE ENCOUNTER
PEER TO PEER DOCTOR CALLED BACK AND ASKED WHY THE PATIENT NEEDS NURTEC       I EXPLAINED PATIENT HAS BEEN ON THIS MEDICATION SINCE 2022 AND HSE HAS TRIED UBREVLEY.       HE SAID HE WOULD PASS THAT ALONG AND WOULD CALL BACK.

## 2025-02-26 ENCOUNTER — OFFICE VISIT (OUTPATIENT)
Dept: FAMILY MEDICINE CLINIC | Facility: CLINIC | Age: 30
End: 2025-02-26
Payer: COMMERCIAL

## 2025-02-26 VITALS
BODY MASS INDEX: 36.11 KG/M2 | DIASTOLIC BLOOD PRESSURE: 60 MMHG | HEART RATE: 80 BPM | TEMPERATURE: 97.7 F | OXYGEN SATURATION: 98 % | SYSTOLIC BLOOD PRESSURE: 110 MMHG | HEIGHT: 63 IN | WEIGHT: 203.8 LBS | RESPIRATION RATE: 14 BRPM

## 2025-02-26 DIAGNOSIS — J02.9 PHARYNGITIS, UNSPECIFIED ETIOLOGY: Primary | ICD-10-CM

## 2025-02-26 DIAGNOSIS — B34.9 VIRAL ILLNESS: ICD-10-CM

## 2025-02-26 LAB
EXPIRATION DATE: NORMAL
INTERNAL CONTROL: NORMAL
Lab: NORMAL
S PYO AG THROAT QL: NEGATIVE

## 2025-02-26 PROCEDURE — 99213 OFFICE O/P EST LOW 20 MIN: CPT | Performed by: NURSE PRACTITIONER

## 2025-02-26 PROCEDURE — 87880 STREP A ASSAY W/OPTIC: CPT | Performed by: NURSE PRACTITIONER

## 2025-02-26 RX ORDER — ACETAMINOPHEN 500 MG
1000 TABLET ORAL EVERY 6 HOURS PRN
COMMUNITY

## 2025-02-26 RX ORDER — AMOXICILLIN 500 MG/1
500 CAPSULE ORAL 3 TIMES DAILY
Qty: 30 CAPSULE | Refills: 0 | Status: SHIPPED | OUTPATIENT
Start: 2025-02-26

## 2025-02-26 NOTE — PROGRESS NOTES
Chief Complaint  Sore Throat (11 weeks gestation) and Fatigue    Subjective        Roselyn Muniz presents to [facility]  History of Present Illness  Sore throat and fatigue the last few days.  Started Saturday and into Sunday.  Coughing started Sunday.  Sore Throat   Pertinent negatives include no coughing or shortness of breath.   Fatigue  Symptoms include fatigue and sore throat.    Pertinent negative symptoms include no chest pain, no cough, no fever, no numbness and no weakness.       The following portions of the patient's history were personally reviewed and updated as appropriate: allergies, current medications, past medical history, past surgical history, past family history, and past social history.     Body mass index is 36.11 kg/m².           Past History:    Medical History: has a past medical history of Allergies, Anemia, Difficulty walking (2022), HBP (high blood pressure), High arches, Migraine, Plantar fasciitis (June 2022), Throat clearing, and Throat fullness.     Surgical History: has a past surgical history that includes No past surgeries.     Family History: family history includes Cancer (age of onset: 75) in her maternal grandmother; Cervical cancer (age of onset: 35) in an other family member; Diabetes in her father; Pancreatic cancer in an other family member.     Social History: reports that she has never smoked. She has never been exposed to tobacco smoke. She has never used smokeless tobacco. She reports that she does not currently use drugs. She reports that she does not drink alcohol.    Allergies: Azithromycin and Triptans          Current Outpatient Medications:     acetaminophen (TYLENOL) 500 MG tablet, Take 2 tablets by mouth Every 6 (Six) Hours As Needed., Disp: , Rfl:     amoxicillin (AMOXIL) 500 MG capsule, Take 1 capsule by mouth 3 (Three) Times a Day., Disp: 30 capsule, Rfl: 0    Biotin 2500 MCG chewable tablet, Chew. (Patient not taking:  "Reported on 2/26/2025), Disp: , Rfl:     ibuprofen (ADVIL,MOTRIN) 800 MG tablet, Take 1 tablet by mouth Every 6 (Six) Hours As Needed for Mild Pain. (Patient not taking: Reported on 2/26/2025), Disp: 90 tablet, Rfl: 1    simvastatin (Zocor) 10 MG tablet, Take 1 tablet by mouth Every Night. (Patient not taking: Reported on 2/26/2025), Disp: 90 tablet, Rfl: 1    Medications Discontinued During This Encounter   Medication Reason    norethindrone-ethinyl estradiol FE (Blisovi FE 1/20) 1-20 MG-MCG per tablet *Therapy completed    Rimegepant Sulfate (NURTEC) 75 MG tablet dispersible tablet *Therapy completed    Rimegepant Sulfate (NURTEC) 75 MG tablet dispersible tablet *Therapy completed         Review of Systems   Constitutional:  Positive for fatigue. Negative for fever.   HENT:  Positive for sore throat.    Respiratory:  Negative for cough and shortness of breath.    Cardiovascular:  Negative for chest pain, palpitations and leg swelling.   Neurological:  Negative for dizziness, weakness, numbness and headache.        Objective         Vitals:    02/26/25 1003   BP: 110/60   BP Location: Right arm   Patient Position: Sitting   Cuff Size: Large Adult   Pulse: 80   Resp: 14   Temp: 97.7 °F (36.5 °C)   TempSrc: Temporal   SpO2: 98%   Weight: 92.4 kg (203 lb 12.8 oz)   Height: 160 cm (62.99\")     Body mass index is 36.11 kg/m².         Physical Exam  Vitals reviewed.   Constitutional:       Appearance: Normal appearance. She is well-developed.   HENT:      Head: Normocephalic and atraumatic.      Right Ear: Tympanic membrane normal.      Left Ear: Tympanic membrane normal.      Mouth/Throat:      Pharynx: Posterior oropharyngeal erythema present. No oropharyngeal exudate.   Eyes:      Conjunctiva/sclera: Conjunctivae normal.      Pupils: Pupils are equal, round, and reactive to light.   Cardiovascular:      Rate and Rhythm: Normal rate and regular rhythm.      Heart sounds: Normal heart sounds. No murmur heard.     No " friction rub. No gallop.   Pulmonary:      Effort: Pulmonary effort is normal.      Breath sounds: Normal breath sounds. No wheezing or rhonchi.   Skin:     General: Skin is warm and dry.   Neurological:      Mental Status: She is alert and oriented to person, place, and time.   Psychiatric:         Mood and Affect: Mood and affect normal.         Behavior: Behavior normal.         Thought Content: Thought content normal.         Judgment: Judgment normal.             Result Review :               Assessment and Plan     Diagnoses and all orders for this visit:    1. Pharyngitis, unspecified etiology (Primary)  -     POC Rapid Strep A  -     amoxicillin (AMOXIL) 500 MG capsule; Take 1 capsule by mouth 3 (Three) Times a Day.  Dispense: 30 capsule; Refill: 0    2. Viral illness      Will try to treat symptoms but if not better will add an antibiotic.        Follow Up     Return for off work today through Friday..    Patient was given instructions and counseling regarding her condition or for health maintenance advice. Please see specific information pulled into the AVS if appropriate.

## 2025-04-28 ENCOUNTER — OFFICE VISIT (OUTPATIENT)
Dept: FAMILY MEDICINE CLINIC | Facility: CLINIC | Age: 30
End: 2025-04-28
Payer: COMMERCIAL

## 2025-04-28 ENCOUNTER — LAB (OUTPATIENT)
Dept: LAB | Facility: HOSPITAL | Age: 30
End: 2025-04-28
Payer: COMMERCIAL

## 2025-04-28 VITALS
WEIGHT: 200.6 LBS | DIASTOLIC BLOOD PRESSURE: 64 MMHG | TEMPERATURE: 96.9 F | RESPIRATION RATE: 18 BRPM | BODY MASS INDEX: 35.54 KG/M2 | HEIGHT: 63 IN | SYSTOLIC BLOOD PRESSURE: 130 MMHG | HEART RATE: 95 BPM | OXYGEN SATURATION: 98 %

## 2025-04-28 DIAGNOSIS — E78.2 MIXED HYPERLIPIDEMIA: ICD-10-CM

## 2025-04-28 DIAGNOSIS — E73.9 DIETARY LACTOSE INTOLERANCE: ICD-10-CM

## 2025-04-28 DIAGNOSIS — G43.809 OTHER MIGRAINE WITHOUT STATUS MIGRAINOSUS, NOT INTRACTABLE: ICD-10-CM

## 2025-04-28 DIAGNOSIS — Z00.00 ANNUAL PHYSICAL EXAM: ICD-10-CM

## 2025-04-28 DIAGNOSIS — M54.6 ACUTE MIDLINE THORACIC BACK PAIN: ICD-10-CM

## 2025-04-28 DIAGNOSIS — Z00.00 ANNUAL PHYSICAL EXAM: Primary | ICD-10-CM

## 2025-04-28 LAB
ALBUMIN SERPL-MCNC: 3.9 G/DL (ref 3.5–5.2)
ALBUMIN/GLOB SERPL: 1.1 G/DL
ALP SERPL-CCNC: 137 U/L (ref 39–117)
ALT SERPL W P-5'-P-CCNC: 13 U/L (ref 1–33)
ANION GAP SERPL CALCULATED.3IONS-SCNC: 14 MMOL/L (ref 5–15)
AST SERPL-CCNC: 20 U/L (ref 1–32)
BACTERIA UR QL AUTO: ABNORMAL /HPF
BASOPHILS # BLD AUTO: 0.03 10*3/MM3 (ref 0–0.2)
BASOPHILS NFR BLD AUTO: 0.2 % (ref 0–1.5)
BILIRUB SERPL-MCNC: 0.2 MG/DL (ref 0–1.2)
BILIRUB UR QL STRIP: NEGATIVE
BUN SERPL-MCNC: 7 MG/DL (ref 6–20)
BUN/CREAT SERPL: 10.9 (ref 7–25)
CALCIUM SPEC-SCNC: 9.5 MG/DL (ref 8.6–10.5)
CHLORIDE SERPL-SCNC: 103 MMOL/L (ref 98–107)
CHOLEST SERPL-MCNC: 321 MG/DL (ref 0–200)
CLARITY UR: ABNORMAL
CO2 SERPL-SCNC: 20 MMOL/L (ref 22–29)
COD CRY URNS QL: PRESENT /HPF
COLOR UR: ABNORMAL
CREAT SERPL-MCNC: 0.64 MG/DL (ref 0.57–1)
DEPRECATED RDW RBC AUTO: 42.1 FL (ref 37–54)
EGFRCR SERPLBLD CKD-EPI 2021: 122.1 ML/MIN/1.73
EOSINOPHIL # BLD AUTO: 0.08 10*3/MM3 (ref 0–0.4)
EOSINOPHIL NFR BLD AUTO: 0.6 % (ref 0.3–6.2)
ERYTHROCYTE [DISTWIDTH] IN BLOOD BY AUTOMATED COUNT: 13.2 % (ref 12.3–15.4)
GLOBULIN UR ELPH-MCNC: 3.5 GM/DL
GLUCOSE SERPL-MCNC: 74 MG/DL (ref 65–99)
GLUCOSE UR STRIP-MCNC: NEGATIVE MG/DL
HCT VFR BLD AUTO: 36.3 % (ref 34–46.6)
HDLC SERPL QL: 6.29
HDLC SERPL-MCNC: 51 MG/DL (ref 40–60)
HGB BLD-MCNC: 12.3 G/DL (ref 12–15.9)
HGB UR QL STRIP.AUTO: NEGATIVE
HOLD SPECIMEN: NORMAL
HYALINE CASTS UR QL AUTO: ABNORMAL /LPF
IMM GRANULOCYTES # BLD AUTO: 0.05 10*3/MM3 (ref 0–0.05)
IMM GRANULOCYTES NFR BLD AUTO: 0.4 % (ref 0–0.5)
KETONES UR QL STRIP: ABNORMAL
LDLC SERPL CALC-MCNC: 238 MG/DL (ref 0–100)
LEUKOCYTE ESTERASE UR QL STRIP.AUTO: ABNORMAL
LYMPHOCYTES # BLD AUTO: 2.05 10*3/MM3 (ref 0.7–3.1)
LYMPHOCYTES NFR BLD AUTO: 16.1 % (ref 19.6–45.3)
MCH RBC QN AUTO: 29.6 PG (ref 26.6–33)
MCHC RBC AUTO-ENTMCNC: 33.9 G/DL (ref 31.5–35.7)
MCV RBC AUTO: 87.5 FL (ref 79–97)
MONOCYTES # BLD AUTO: 0.64 10*3/MM3 (ref 0.1–0.9)
MONOCYTES NFR BLD AUTO: 5 % (ref 5–12)
NEUTROPHILS NFR BLD AUTO: 77.7 % (ref 42.7–76)
NEUTROPHILS NFR BLD AUTO: 9.89 10*3/MM3 (ref 1.7–7)
NITRITE UR QL STRIP: NEGATIVE
PH UR STRIP.AUTO: 6 [PH] (ref 5–8)
PLATELET # BLD AUTO: 295 10*3/MM3 (ref 140–450)
PMV BLD AUTO: 12.7 FL (ref 6–12)
POTASSIUM SERPL-SCNC: 3.8 MMOL/L (ref 3.5–5.2)
PROT SERPL-MCNC: 7.4 G/DL (ref 6–8.5)
PROT UR QL STRIP: ABNORMAL
RBC # BLD AUTO: 4.15 10*6/MM3 (ref 3.77–5.28)
RBC # UR STRIP: ABNORMAL /HPF
REF LAB TEST METHOD: ABNORMAL
SODIUM SERPL-SCNC: 137 MMOL/L (ref 136–145)
SP GR UR STRIP: >1.03 (ref 1–1.03)
SQUAMOUS #/AREA URNS HPF: ABNORMAL /HPF
TRIGL SERPL-MCNC: 167 MG/DL (ref 0–150)
TSH SERPL DL<=0.05 MIU/L-ACNC: 2.71 UIU/ML (ref 0.27–4.2)
UROBILINOGEN UR QL STRIP: ABNORMAL
VLDLC SERPL-MCNC: 32 MG/DL (ref 5–40)
WBC # UR STRIP: ABNORMAL /HPF
WBC NRBC COR # BLD AUTO: 12.74 10*3/MM3 (ref 3.4–10.8)

## 2025-04-28 PROCEDURE — 86003 ALLG SPEC IGE CRUDE XTRC EA: CPT

## 2025-04-28 PROCEDURE — 80061 LIPID PANEL: CPT

## 2025-04-28 PROCEDURE — 36415 COLL VENOUS BLD VENIPUNCTURE: CPT

## 2025-04-28 PROCEDURE — 80050 GENERAL HEALTH PANEL: CPT

## 2025-04-28 PROCEDURE — 99395 PREV VISIT EST AGE 18-39: CPT | Performed by: NURSE PRACTITIONER

## 2025-04-28 PROCEDURE — 87086 URINE CULTURE/COLONY COUNT: CPT

## 2025-04-28 PROCEDURE — 81001 URINALYSIS AUTO W/SCOPE: CPT

## 2025-04-28 RX ORDER — ASPIRIN 81 MG/1
81 TABLET ORAL DAILY
COMMUNITY

## 2025-04-28 RX ORDER — PRENATAL VIT/IRON FUM/FOLIC AC 27MG-0.8MG
1 TABLET ORAL DAILY
COMMUNITY

## 2025-04-28 NOTE — PROGRESS NOTES
Chief Complaint  Annual Exam (20 weeks pregnant), food allery (Possible cow's milk allergy), and Back Pain (Comes and goes - mid back pain)    Subjective        Roselyn Muniz presents to [facility]  History of Present Illness  States that stopped zocor last year because how bad she felt taking it.  And needed to stop for pregnancy.    Annual exam:  20 weeks pregnant.    Concerned for a food allergy  thinks may have a mild milk allergy with any dairy products.    Mid back pain comes and goes.  Started before pregnancy and is center in throacic.    Headaches:  Nurtec worked well but now has to try something else on formulary.  Allergy to 5HT's.    Back Pain  Pertinent negatives include no chest pain, fever, numbness or weakness.       The following portions of the patient's history were personally reviewed and updated as appropriate: allergies, current medications, past medical history, past surgical history, past family history, and past social history.     Body mass index is 35.54 kg/m².    Class 2 Severe Obesity (BMI >=35 and <=39.9). Obesity-related health conditions include the following: dyslipidemias. Obesity is  partially due to pregnancy . BMI is is above average; BMI management plan is completed. We discussed portion control and increasing exercise.      Past History:    Medical History: has a past medical history of Allergies, Anemia, Difficulty walking (2022), HBP (high blood pressure), High arches, Migraine, Plantar fasciitis (June 2022), Throat clearing, and Throat fullness.     Surgical History: has a past surgical history that includes No past surgeries.     Family History: family history includes Cancer (age of onset: 75) in her maternal grandmother; Cervical cancer (age of onset: 35) in an other family member; Diabetes in her father; Pancreatic cancer in an other family member.     Social History: reports that she has never smoked. She has never been exposed to  "tobacco smoke. She has never used smokeless tobacco. She reports that she does not currently use drugs. She reports that she does not drink alcohol.    Allergies: Azithromycin and Triptans          Current Outpatient Medications:     acetaminophen (TYLENOL) 500 MG tablet, Take 2 tablets by mouth Every 6 (Six) Hours As Needed., Disp: , Rfl:     aspirin 81 MG EC tablet, Take 1 tablet by mouth Daily., Disp: , Rfl:     Prenatal Vit-Fe Fumarate-FA (prenatal vitamin 27-0.8) 27-0.8 MG tablet tablet, Take 1 tablet by mouth Daily., Disp: , Rfl:     Medications Discontinued During This Encounter   Medication Reason    Biotin 2500 MCG chewable tablet *Therapy completed    ibuprofen (ADVIL,MOTRIN) 800 MG tablet *Therapy completed    simvastatin (Zocor) 10 MG tablet *Therapy completed    amoxicillin (AMOXIL) 500 MG capsule *Therapy completed         Review of Systems   Constitutional:  Negative for fever.   Respiratory:  Negative for cough and shortness of breath.    Cardiovascular:  Negative for chest pain, palpitations and leg swelling.   Musculoskeletal:  Positive for back pain.   Neurological:  Negative for dizziness, weakness, numbness and headache.        Objective         Vitals:    04/28/25 0726   BP: 130/64   BP Location: Right arm   Patient Position: Sitting   Cuff Size: Large Adult   Pulse: 95   Resp: 18   Temp: 96.9 °F (36.1 °C)   TempSrc: Temporal   SpO2: 98%   Weight: 91 kg (200 lb 9.6 oz)   Height: 160 cm (62.99\")     Body mass index is 35.54 kg/m².         Physical Exam  Vitals reviewed.   Constitutional:       Appearance: Normal appearance. She is well-developed.   HENT:      Head: Normocephalic and atraumatic.      Mouth/Throat:      Pharynx: No oropharyngeal exudate.   Eyes:      Conjunctiva/sclera: Conjunctivae normal.      Pupils: Pupils are equal, round, and reactive to light.   Cardiovascular:      Rate and Rhythm: Normal rate and regular rhythm.      Heart sounds: Normal heart sounds. No murmur heard.     " No friction rub. No gallop.   Pulmonary:      Effort: Pulmonary effort is normal.      Breath sounds: Normal breath sounds. No wheezing or rhonchi.   Abdominal:      General: Bowel sounds are normal.      Palpations: Abdomen is soft.      Tenderness: There is no abdominal tenderness. There is no right CVA tenderness, left CVA tenderness or guarding.   Musculoskeletal:         General: Normal range of motion.        Arms:       Cervical back: Normal range of motion.      Comments: Tenderness to palpation   Skin:     General: Skin is warm and dry.   Neurological:      Mental Status: She is alert and oriented to person, place, and time.   Psychiatric:         Mood and Affect: Mood and affect normal.         Behavior: Behavior normal.         Thought Content: Thought content normal.         Judgment: Judgment normal.             Result Review :               Assessment and Plan     Diagnoses and all orders for this visit:    1. Annual physical exam (Primary)  -     Comprehensive Metabolic Panel; Future  -     Lipid Panel With / Chol / HDL Ratio; Future  -     Urinalysis With Culture If Indicated -; Future  -     CBC & Differential; Future  -     TSH Rfx On Abnormal To Free T4; Future    2. Acute midline thoracic back pain    3. Dietary lactose intolerance  -     Food Allergy Profile; Future    4. Mixed hyperlipidemia    5. Other migraine without status migrainosus, not intractable    If ready to start after pregnancy will have to try injectable to get back to Nurtec     Will have to wait on imaging due to pregnancy and no trayma to be worth the risk of imaging.      Follow Up     Return in about 1 year (around 4/28/2026).    Patient was given instructions and counseling regarding her condition or for health maintenance advice. Please see specific information pulled into the AVS if appropriate.

## 2025-04-29 LAB — BACTERIA SPEC AEROBE CULT: NO GROWTH

## 2025-05-05 LAB
CLAM IGE QN: <0.1 KU/L
CODFISH IGE QN: <0.1 KU/L
CONV CLASS DESCRIPTION: NORMAL
CORN IGE QN: <0.1 KU/L
COW MILK IGE QN: <0.1 KU/L
EGG WHITE IGE QN: <0.1 KU/L
PEANUT IGE QN: <0.1 KU/L
SCALLOP IGE QN: <0.1 KU/L
SESAME SEED IGE QN: <0.1 KU/L
SHRIMP IGE QN: <0.1 KU/L
SOYBEAN IGE QN: <0.1 KU/L
WALNUT IGE QN: <0.1 KU/L
WHEAT IGE QN: <0.1 KU/L

## 2025-05-28 ENCOUNTER — OFFICE VISIT (OUTPATIENT)
Dept: FAMILY MEDICINE CLINIC | Facility: CLINIC | Age: 30
End: 2025-05-28
Payer: COMMERCIAL

## 2025-05-28 VITALS
RESPIRATION RATE: 18 BRPM | TEMPERATURE: 97 F | WEIGHT: 201 LBS | HEIGHT: 63 IN | BODY MASS INDEX: 35.61 KG/M2 | OXYGEN SATURATION: 97 % | HEART RATE: 97 BPM | DIASTOLIC BLOOD PRESSURE: 70 MMHG | SYSTOLIC BLOOD PRESSURE: 124 MMHG

## 2025-05-28 DIAGNOSIS — J06.9 UPPER RESPIRATORY TRACT INFECTION, UNSPECIFIED TYPE: Primary | ICD-10-CM

## 2025-05-28 PROCEDURE — 99213 OFFICE O/P EST LOW 20 MIN: CPT | Performed by: STUDENT IN AN ORGANIZED HEALTH CARE EDUCATION/TRAINING PROGRAM

## 2025-05-28 RX ORDER — PREDNISONE 20 MG/1
20 TABLET ORAL DAILY
Qty: 5 TABLET | Refills: 0 | Status: SHIPPED | OUTPATIENT
Start: 2025-05-28

## 2025-05-28 RX ORDER — AMOXICILLIN 500 MG/1
500 CAPSULE ORAL 2 TIMES DAILY
Qty: 20 CAPSULE | Refills: 0 | Status: SHIPPED | OUTPATIENT
Start: 2025-05-28

## 2025-05-28 NOTE — PROGRESS NOTES
"Chief Complaint  Cough and URI    Subjective         Roselyn Muniz is a 29 y.o. female who presents to [facility]    29 years old, 24 weeks pregnant, comes to clinic today for an acute visit.    Patient reports 2 to 3 days active symptoms of cough/congestion/sinus drainage and some upper back muscle soreness/pain.  Denies any chest pain or shortness of breath at rest.  Reports sick contact at home, daughter was having same symptoms who tested negative for strep/COVID and flu.  Patient does not want to get tested.  Denies any fever/GI symptoms/anosmia or any pregnancy complications.    Tolerating p.o. intake without any difficulties, still working as a .    Objective   Vital Signs:   Vitals:    05/28/25 1125   BP: 124/70   Pulse: 97   Resp: 18   Temp: 97 °F (36.1 °C)   TempSrc: Temporal   SpO2: 97%   Weight: 91.2 kg (201 lb)   Height: 160 cm (63\")   PainSc: 4       Body mass index is 35.61 kg/m².   Wt Readings from Last 3 Encounters:   05/28/25 91.2 kg (201 lb)   04/28/25 91 kg (200 lb 9.6 oz)   02/26/25 92.4 kg (203 lb 12.8 oz)      BP Readings from Last 3 Encounters:   05/28/25 124/70   04/28/25 130/64   02/26/25 110/60        Patient Care Team:  Tomasz Dasilva APRN as PCP - General (Nurse Practitioner)     Physical Exam  HENT:      Right Ear: Tympanic membrane normal.      Left Ear: Tympanic membrane normal.      Mouth/Throat:      Pharynx: Postnasal drip present. No pharyngeal swelling or posterior oropharyngeal erythema.      Comments: Some noticeable mucosal inflammation noted to pharyngeal wall/throat without any significant redness  Pulmonary:      Effort: Pulmonary effort is normal.      Breath sounds: Normal breath sounds.                            Assessment and Plan   Diagnoses and all orders for this visit:    1. Upper respiratory tract infection, unspecified type (Primary)  -     predniSONE (DELTASONE) 20 MG tablet; Take 1 tablet by mouth Daily.  Dispense: " 5 tablet; Refill: 0  -     amoxicillin (AMOXIL) 500 MG capsule; Take 1 capsule by mouth 2 (Two) Times a Day.  Dispense: 20 capsule; Refill: 0    Based on patient's symptoms and physical findings, her symptoms are most likely related to viral in nature.    We will avoid x-ray and unnecessary antibiotics due to current pregnancy.  I have prescribed prednisone and delayed prescription of amoxicillin.  Patient to use amoxicillin if any worsening in next 2 to 3 days or not improved.  Patient is aware to not use amoxicillin/if not needed    Patient to call if not improved or any worsening that may require further evaluation and treatment approach      Tobacco Use: Low Risk  (5/28/2025)    Patient History     Smoking Tobacco Use: Never     Smokeless Tobacco Use: Never     Passive Exposure: Never            Follow Up   Return if symptoms worsen or fail to improve.  Patient was given instructions and counseling regarding her condition or for health maintenance advice. Please see specific information pulled into the AVS if appropriate.